# Patient Record
Sex: FEMALE | Race: WHITE | Employment: FULL TIME | URBAN - METROPOLITAN AREA
[De-identification: names, ages, dates, MRNs, and addresses within clinical notes are randomized per-mention and may not be internally consistent; named-entity substitution may affect disease eponyms.]

---

## 2018-06-03 ENCOUNTER — HOSPITAL ENCOUNTER (EMERGENCY)
Age: 23
Discharge: HOME | End: 2018-06-03
Payer: MEDICAID

## 2018-06-03 ENCOUNTER — APPOINTMENT (OUTPATIENT)
Dept: GENERAL RADIOLOGY | Age: 23
End: 2018-06-03
Payer: MEDICAID

## 2018-06-03 VITALS
TEMPERATURE: 98.2 F | HEIGHT: 68 IN | HEART RATE: 87 BPM | DIASTOLIC BLOOD PRESSURE: 87 MMHG | RESPIRATION RATE: 16 BRPM | OXYGEN SATURATION: 99 % | BODY MASS INDEX: 28.79 KG/M2 | WEIGHT: 190 LBS | SYSTOLIC BLOOD PRESSURE: 133 MMHG

## 2018-06-03 DIAGNOSIS — J04.0 LARYNGITIS: Primary | ICD-10-CM

## 2018-06-03 PROCEDURE — 99282 EMERGENCY DEPT VISIT SF MDM: CPT

## 2018-06-03 PROCEDURE — 6370000000 HC RX 637 (ALT 250 FOR IP): Performed by: PHYSICIAN ASSISTANT

## 2018-06-03 PROCEDURE — 71046 X-RAY EXAM CHEST 2 VIEWS: CPT

## 2018-06-03 RX ORDER — PREDNISONE 20 MG/1
40 TABLET ORAL ONCE
Status: COMPLETED | OUTPATIENT
Start: 2018-06-03 | End: 2018-06-03

## 2018-06-03 RX ADMIN — PREDNISONE 40 MG: 20 TABLET ORAL at 22:31

## 2018-06-03 ASSESSMENT — PAIN SCALES - GENERAL: PAINLEVEL_OUTOF10: 3

## 2018-06-03 ASSESSMENT — PAIN DESCRIPTION - LOCATION: LOCATION: THROAT

## 2018-06-03 ASSESSMENT — PAIN DESCRIPTION - PAIN TYPE: TYPE: ACUTE PAIN

## 2018-10-26 ENCOUNTER — HOSPITAL ENCOUNTER (OUTPATIENT)
Age: 23
Setting detail: OBSERVATION
Discharge: HOME | End: 2018-10-28
Attending: EMERGENCY MEDICINE | Admitting: OBSTETRICS & GYNECOLOGY
Payer: MEDICAID

## 2018-10-26 DIAGNOSIS — E87.6 HYPOKALEMIA: ICD-10-CM

## 2018-10-26 DIAGNOSIS — O21.1 HYPEREMESIS GRAVIDARUM WITH ELECTROLYTE IMBALANCE: Primary | ICD-10-CM

## 2018-10-26 PROBLEM — R11.10 HYPEREMESIS: Status: ACTIVE | Noted: 2018-10-26

## 2018-10-26 LAB
ANION GAP SERPL CALCULATED.3IONS-SCNC: 12 MMOL/L (ref 7–16)
BACTERIA: ABNORMAL /HPF
BASOPHILS ABSOLUTE: 0.01 E9/L (ref 0–0.2)
BASOPHILS RELATIVE PERCENT: 0.1 % (ref 0–2)
BILIRUBIN URINE: NEGATIVE
BLOOD, URINE: ABNORMAL
BUN BLDV-MCNC: 5 MG/DL (ref 6–20)
CALCIUM SERPL-MCNC: 9.1 MG/DL (ref 8.6–10.2)
CHLORIDE BLD-SCNC: 98 MMOL/L (ref 98–107)
CLARITY: CLEAR
CO2: 24 MMOL/L (ref 22–29)
COLOR: YELLOW
CREAT SERPL-MCNC: 0.5 MG/DL (ref 0.5–1)
EOSINOPHILS ABSOLUTE: 0.01 E9/L (ref 0.05–0.5)
EOSINOPHILS RELATIVE PERCENT: 0.1 % (ref 0–6)
EPITHELIAL CELLS, UA: ABNORMAL /HPF
GFR AFRICAN AMERICAN: >60
GFR NON-AFRICAN AMERICAN: >60 ML/MIN/1.73
GLUCOSE BLD-MCNC: 77 MG/DL (ref 74–109)
GLUCOSE URINE: NEGATIVE MG/DL
HCT VFR BLD CALC: 35.9 % (ref 34–48)
HEMOGLOBIN: 12.3 G/DL (ref 11.5–15.5)
IMMATURE GRANULOCYTES #: 0.04 E9/L
IMMATURE GRANULOCYTES %: 0.4 % (ref 0–5)
KETONES, URINE: >=80 MG/DL
LEUKOCYTE ESTERASE, URINE: NEGATIVE
LYMPHOCYTES ABSOLUTE: 1.77 E9/L (ref 1.5–4)
LYMPHOCYTES RELATIVE PERCENT: 17.1 % (ref 20–42)
MAGNESIUM: 2 MG/DL (ref 1.6–2.6)
MCH RBC QN AUTO: 29.4 PG (ref 26–35)
MCHC RBC AUTO-ENTMCNC: 34.3 % (ref 32–34.5)
MCV RBC AUTO: 85.9 FL (ref 80–99.9)
MONOCYTES ABSOLUTE: 0.76 E9/L (ref 0.1–0.95)
MONOCYTES RELATIVE PERCENT: 7.4 % (ref 2–12)
MUCUS: PRESENT
NEUTROPHILS ABSOLUTE: 7.74 E9/L (ref 1.8–7.3)
NEUTROPHILS RELATIVE PERCENT: 74.9 % (ref 43–80)
NITRITE, URINE: NEGATIVE
PDW BLD-RTO: 12.9 FL (ref 11.5–15)
PH UA: 6 (ref 5–9)
PLATELET # BLD: 260 E9/L (ref 130–450)
PMV BLD AUTO: 10.1 FL (ref 7–12)
POTASSIUM REFLEX MAGNESIUM: 3.2 MMOL/L (ref 3.5–5)
PROTEIN UA: NEGATIVE MG/DL
RBC # BLD: 4.18 E12/L (ref 3.5–5.5)
RBC UA: ABNORMAL /HPF (ref 0–2)
SODIUM BLD-SCNC: 134 MMOL/L (ref 132–146)
SPECIFIC GRAVITY UA: 1.02 (ref 1–1.03)
UROBILINOGEN, URINE: 1 E.U./DL
WBC # BLD: 10.3 E9/L (ref 4.5–11.5)
WBC UA: ABNORMAL /HPF (ref 0–5)

## 2018-10-26 PROCEDURE — 96374 THER/PROPH/DIAG INJ IV PUSH: CPT

## 2018-10-26 PROCEDURE — 6360000002 HC RX W HCPCS: Performed by: STUDENT IN AN ORGANIZED HEALTH CARE EDUCATION/TRAINING PROGRAM

## 2018-10-26 PROCEDURE — 2580000003 HC RX 258: Performed by: STUDENT IN AN ORGANIZED HEALTH CARE EDUCATION/TRAINING PROGRAM

## 2018-10-26 PROCEDURE — 96375 TX/PRO/DX INJ NEW DRUG ADDON: CPT

## 2018-10-26 PROCEDURE — G0378 HOSPITAL OBSERVATION PER HR: HCPCS

## 2018-10-26 PROCEDURE — 80048 BASIC METABOLIC PNL TOTAL CA: CPT

## 2018-10-26 PROCEDURE — 99284 EMERGENCY DEPT VISIT MOD MDM: CPT

## 2018-10-26 PROCEDURE — 81001 URINALYSIS AUTO W/SCOPE: CPT

## 2018-10-26 PROCEDURE — 6370000000 HC RX 637 (ALT 250 FOR IP): Performed by: STUDENT IN AN ORGANIZED HEALTH CARE EDUCATION/TRAINING PROGRAM

## 2018-10-26 PROCEDURE — 83735 ASSAY OF MAGNESIUM: CPT

## 2018-10-26 PROCEDURE — 85025 COMPLETE CBC W/AUTO DIFF WBC: CPT

## 2018-10-26 RX ORDER — 0.9 % SODIUM CHLORIDE 0.9 %
1000 INTRAVENOUS SOLUTION INTRAVENOUS ONCE
Status: COMPLETED | OUTPATIENT
Start: 2018-10-26 | End: 2018-10-26

## 2018-10-26 RX ORDER — ONDANSETRON 2 MG/ML
4 INJECTION INTRAMUSCULAR; INTRAVENOUS EVERY 6 HOURS PRN
Status: DISCONTINUED | OUTPATIENT
Start: 2018-10-26 | End: 2018-10-26

## 2018-10-26 RX ORDER — LANOLIN ALCOHOL/MO/W.PET/CERES
50 CREAM (GRAM) TOPICAL DAILY
Status: ON HOLD | COMMUNITY
End: 2019-06-15 | Stop reason: HOSPADM

## 2018-10-26 RX ORDER — SODIUM CHLORIDE 0.9 % (FLUSH) 0.9 %
10 SYRINGE (ML) INJECTION EVERY 12 HOURS SCHEDULED
Status: DISCONTINUED | OUTPATIENT
Start: 2018-10-26 | End: 2018-10-28 | Stop reason: HOSPADM

## 2018-10-26 RX ORDER — SODIUM CHLORIDE 0.9 % (FLUSH) 0.9 %
10 SYRINGE (ML) INJECTION PRN
Status: DISCONTINUED | OUTPATIENT
Start: 2018-10-26 | End: 2018-10-28 | Stop reason: HOSPADM

## 2018-10-26 RX ORDER — POTASSIUM CHLORIDE 20 MEQ/1
20 TABLET, EXTENDED RELEASE ORAL ONCE
Status: COMPLETED | OUTPATIENT
Start: 2018-10-26 | End: 2018-10-26

## 2018-10-26 RX ORDER — ONDANSETRON 2 MG/ML
4 INJECTION INTRAMUSCULAR; INTRAVENOUS EVERY 6 HOURS PRN
Status: DISCONTINUED | OUTPATIENT
Start: 2018-10-26 | End: 2018-10-28 | Stop reason: HOSPADM

## 2018-10-26 RX ORDER — ACETAMINOPHEN 325 MG/1
650 TABLET ORAL EVERY 4 HOURS PRN
Status: DISCONTINUED | OUTPATIENT
Start: 2018-10-26 | End: 2018-10-28 | Stop reason: HOSPADM

## 2018-10-26 RX ORDER — POTASSIUM CHLORIDE 7.45 MG/ML
10 INJECTION INTRAVENOUS ONCE
Status: DISCONTINUED | OUTPATIENT
Start: 2018-10-26 | End: 2018-10-26

## 2018-10-26 RX ORDER — SODIUM CHLORIDE, SODIUM LACTATE, POTASSIUM CHLORIDE, CALCIUM CHLORIDE 600; 310; 30; 20 MG/100ML; MG/100ML; MG/100ML; MG/100ML
INJECTION, SOLUTION INTRAVENOUS CONTINUOUS
Status: DISCONTINUED | OUTPATIENT
Start: 2018-10-26 | End: 2018-10-28

## 2018-10-26 RX ORDER — DEXTROSE, SODIUM CHLORIDE, SODIUM LACTATE, POTASSIUM CHLORIDE, AND CALCIUM CHLORIDE 5; .6; .31; .03; .02 G/100ML; G/100ML; G/100ML; G/100ML; G/100ML
1000 INJECTION, SOLUTION INTRAVENOUS ONCE
Status: COMPLETED | OUTPATIENT
Start: 2018-10-26 | End: 2018-10-26

## 2018-10-26 RX ORDER — PROMETHAZINE HYDROCHLORIDE 25 MG/1
25 TABLET ORAL EVERY 6 HOURS PRN
COMMUNITY
End: 2018-11-02 | Stop reason: SDUPTHER

## 2018-10-26 RX ADMIN — SODIUM CHLORIDE, SODIUM LACTATE, POTASSIUM CHLORIDE, CALCIUM CHLORIDE AND DEXTROSE MONOHYDRATE 1000 ML: 5; 600; 310; 30; 20 INJECTION, SOLUTION INTRAVENOUS at 19:57

## 2018-10-26 RX ADMIN — SODIUM CHLORIDE, POTASSIUM CHLORIDE, SODIUM LACTATE AND CALCIUM CHLORIDE: 600; 310; 30; 20 INJECTION, SOLUTION INTRAVENOUS at 20:59

## 2018-10-26 RX ADMIN — SODIUM CHLORIDE 1000 ML: 9 INJECTION, SOLUTION INTRAVENOUS at 18:02

## 2018-10-26 RX ADMIN — POTASSIUM CHLORIDE 20 MEQ: 20 TABLET, EXTENDED RELEASE ORAL at 18:41

## 2018-10-26 RX ADMIN — ONDANSETRON 4 MG: 2 INJECTION INTRAMUSCULAR; INTRAVENOUS at 18:02

## 2018-10-26 RX ADMIN — SODIUM CHLORIDE 1000 ML: 9 INJECTION, SOLUTION INTRAVENOUS at 19:05

## 2018-10-26 ASSESSMENT — ENCOUNTER SYMPTOMS
DIARRHEA: 0
SINUS PRESSURE: 0
ABDOMINAL DISTENTION: 0
WHEEZING: 0
ABDOMINAL PAIN: 0
COUGH: 0
BACK PAIN: 0
SHORTNESS OF BREATH: 0
EYE PAIN: 0
VOMITING: 1
COLOR CHANGE: 0
SORE THROAT: 0
EYE REDNESS: 0
EYE DISCHARGE: 0
BLOOD IN STOOL: 0
NAUSEA: 1

## 2018-10-26 ASSESSMENT — PAIN SCALES - GENERAL
PAINLEVEL_OUTOF10: 0
PAINLEVEL_OUTOF10: 2

## 2018-10-26 NOTE — ED PROVIDER NOTES
Perla Haro is a 26year old  female who presents with nausea and vomiting that has been present since . She was unable to eat since . She was seen at [facility] 3 times in the last week. She is followed by [facility]. She was seen there today. She was advised to come to the ED if symptoms returned today. She has tried phenergan, phenergan suppositories, Reglan, and B6 without improvement. Patient reports having these symptoms with one of her previous pregnancies. She reports that she was diagnosed with UTI at [facility] and given Keflex however, she has been unable to take it due to vomiting. Patient reports that she has had an ultrasound this pregnancy. She denies any hematemesis. She also denies vaginal bleeding, discharge, contractions, loss of fluid or tissue. The history is provided by the patient. Nausea & Vomiting   Severity:  Severe  Duration:  5 days  Timing:  Constant  Number of daily episodes:  10  Quality:  Stomach contents and undigested food  Feeding tolerance: nothing. How soon after eating does vomiting occur:  5 minutes  Progression:  Worsening  Chronicity:  New  Recent urination:  Decreased  Relieved by:  Nothing  Worsened by:  Nothing  Ineffective treatments:  Antiemetics  Associated symptoms: no abdominal pain, no arthralgias, no chills, no cough, no diarrhea, no fever, no headaches, no sore throat and no URI    Risk factors: pregnant    Risk factors: no sick contacts        Review of Systems   Constitutional: Negative for chills and fever. HENT: Negative for congestion, ear pain, sinus pressure and sore throat. Eyes: Negative for pain, discharge and redness. Respiratory: Negative for cough, shortness of breath and wheezing. Cardiovascular: Negative for chest pain, palpitations and leg swelling. Gastrointestinal: Positive for nausea and vomiting. Negative for abdominal distention, abdominal pain, blood in stool and diarrhea. of Zofran were discussed with patient she was informed of the new information re increased risk of miscarriage and malformations with taking Zofran in the first trimester. Patient would like to try Zofran as she is unable to tolerate diet PO. She was found to have ketones on UA and mild hypokalemia. Patient was able to tolerate oral potassium following Zofran. She does not currently have OB. Dr. Michelle Reeves will admit patient for observation, fluids, and antiemetics. Patient comfortable with plan. She has been seen in ED 3 times prior to today.              --------------------------------------------- PAST HISTORY ---------------------------------------------  Past Medical History:  has no past medical history on file. Past Surgical History:  has no past surgical history on file. Social History:  reports that she has never smoked. She has never used smokeless tobacco. She reports that she does not drink alcohol or use drugs. Family History: family history is not on file. The patients home medications have been reviewed. Allergies: Patient has no known allergies.     -------------------------------------------------- RESULTS -------------------------------------------------    LABS:  Results for orders placed or performed during the hospital encounter of 10/26/18   Urinalysis with Microscopic   Result Value Ref Range    Color, UA Yellow Straw/Yellow    Clarity, UA Clear Clear    Glucose, Ur Negative Negative mg/dL    Bilirubin Urine Negative Negative    Ketones, Urine >=80 (A) Negative mg/dL    Specific Gravity, UA 1.020 1.005 - 1.030    Blood, Urine TRACE-INTACT Negative    pH, UA 6.0 5.0 - 9.0    Protein, UA Negative Negative mg/dL    Urobilinogen, Urine 1.0 <2.0 E.U./dL    Nitrite, Urine Negative Negative    Leukocyte Esterase, Urine Negative Negative    Mucus, UA Present     WBC, UA 1-3 0 - 5 /HPF    RBC, UA 2-5 0 - 2 /HPF    Epi Cells FEW /HPF    Bacteria, UA RARE (A) /HPF   CBC auto differential

## 2018-10-27 ENCOUNTER — APPOINTMENT (OUTPATIENT)
Dept: ULTRASOUND IMAGING | Age: 23
End: 2018-10-27
Payer: MEDICAID

## 2018-10-27 LAB
ALBUMIN SERPL-MCNC: 4.1 G/DL (ref 3.5–5.2)
ALP BLD-CCNC: 74 U/L (ref 35–104)
ALT SERPL-CCNC: 21 U/L (ref 0–32)
ANION GAP SERPL CALCULATED.3IONS-SCNC: 11 MMOL/L (ref 7–16)
AST SERPL-CCNC: 17 U/L (ref 0–31)
BACTERIA: ABNORMAL /HPF
BILIRUB SERPL-MCNC: 0.5 MG/DL (ref 0–1.2)
BILIRUBIN URINE: NEGATIVE
BLOOD, URINE: NEGATIVE
BUN BLDV-MCNC: 4 MG/DL (ref 6–20)
CALCIUM SERPL-MCNC: 8.8 MG/DL (ref 8.6–10.2)
CHLORIDE BLD-SCNC: 102 MMOL/L (ref 98–107)
CLARITY: CLEAR
CO2: 24 MMOL/L (ref 22–29)
COLOR: YELLOW
CREAT SERPL-MCNC: 0.5 MG/DL (ref 0.5–1)
EPITHELIAL CELLS, UA: ABNORMAL /HPF
GFR AFRICAN AMERICAN: >60
GFR NON-AFRICAN AMERICAN: >60 ML/MIN/1.73
GLUCOSE BLD-MCNC: 72 MG/DL (ref 74–109)
GLUCOSE URINE: NEGATIVE MG/DL
KETONES, URINE: >=80 MG/DL
LEUKOCYTE ESTERASE, URINE: NEGATIVE
NITRITE, URINE: NEGATIVE
PH UA: 7 (ref 5–9)
POTASSIUM SERPL-SCNC: 3.2 MMOL/L (ref 3.5–5)
PROTEIN UA: NEGATIVE MG/DL
RBC UA: ABNORMAL /HPF (ref 0–2)
SODIUM BLD-SCNC: 137 MMOL/L (ref 132–146)
SPECIFIC GRAVITY UA: 1.01 (ref 1–1.03)
TOTAL PROTEIN: 6.8 G/DL (ref 6.4–8.3)
UROBILINOGEN, URINE: 1 E.U./DL
WBC UA: ABNORMAL /HPF (ref 0–5)

## 2018-10-27 PROCEDURE — 2580000003 HC RX 258: Performed by: STUDENT IN AN ORGANIZED HEALTH CARE EDUCATION/TRAINING PROGRAM

## 2018-10-27 PROCEDURE — 81001 URINALYSIS AUTO W/SCOPE: CPT

## 2018-10-27 PROCEDURE — 2580000003 HC RX 258: Performed by: FAMILY MEDICINE

## 2018-10-27 PROCEDURE — 6370000000 HC RX 637 (ALT 250 FOR IP): Performed by: FAMILY MEDICINE

## 2018-10-27 PROCEDURE — 96372 THER/PROPH/DIAG INJ SC/IM: CPT

## 2018-10-27 PROCEDURE — 80053 COMPREHEN METABOLIC PANEL: CPT

## 2018-10-27 PROCEDURE — 36415 COLL VENOUS BLD VENIPUNCTURE: CPT

## 2018-10-27 PROCEDURE — 76817 TRANSVAGINAL US OBSTETRIC: CPT

## 2018-10-27 PROCEDURE — 6370000000 HC RX 637 (ALT 250 FOR IP): Performed by: INTERNAL MEDICINE

## 2018-10-27 PROCEDURE — G0378 HOSPITAL OBSERVATION PER HR: HCPCS

## 2018-10-27 PROCEDURE — 2580000003 HC RX 258: Performed by: OBSTETRICS & GYNECOLOGY

## 2018-10-27 PROCEDURE — 6360000002 HC RX W HCPCS: Performed by: OBSTETRICS & GYNECOLOGY

## 2018-10-27 RX ORDER — PROMETHAZINE HYDROCHLORIDE 25 MG/ML
6.25 INJECTION, SOLUTION INTRAMUSCULAR; INTRAVENOUS EVERY 6 HOURS PRN
Status: DISCONTINUED | OUTPATIENT
Start: 2018-10-27 | End: 2018-10-28 | Stop reason: HOSPADM

## 2018-10-27 RX ORDER — POTASSIUM CHLORIDE 20 MEQ/1
40 TABLET, EXTENDED RELEASE ORAL ONCE
Status: COMPLETED | OUTPATIENT
Start: 2018-10-27 | End: 2018-10-27

## 2018-10-27 RX ORDER — DEXTROSE, SODIUM CHLORIDE, SODIUM LACTATE, POTASSIUM CHLORIDE, AND CALCIUM CHLORIDE 5; .6; .31; .03; .02 G/100ML; G/100ML; G/100ML; G/100ML; G/100ML
INJECTION, SOLUTION INTRAVENOUS CONTINUOUS
Status: DISCONTINUED | OUTPATIENT
Start: 2018-10-27 | End: 2018-10-28 | Stop reason: HOSPADM

## 2018-10-27 RX ORDER — POTASSIUM BICARBONATE 25 MEQ/1
25 TABLET, EFFERVESCENT ORAL ONCE
Status: DISCONTINUED | OUTPATIENT
Start: 2018-10-27 | End: 2018-10-28

## 2018-10-27 RX ADMIN — SODIUM CHLORIDE, SODIUM LACTATE, POTASSIUM CHLORIDE, CALCIUM CHLORIDE AND DEXTROSE MONOHYDRATE: 5; 600; 310; 30; 20 INJECTION, SOLUTION INTRAVENOUS at 18:57

## 2018-10-27 RX ADMIN — SODIUM CHLORIDE, SODIUM LACTATE, POTASSIUM CHLORIDE, CALCIUM CHLORIDE AND DEXTROSE MONOHYDRATE: 5; 600; 310; 30; 20 INJECTION, SOLUTION INTRAVENOUS at 12:44

## 2018-10-27 RX ADMIN — ACETAMINOPHEN 650 MG: 325 TABLET ORAL at 02:11

## 2018-10-27 RX ADMIN — Medication 10 ML: at 09:41

## 2018-10-27 RX ADMIN — ACETAMINOPHEN 650 MG: 325 TABLET ORAL at 13:31

## 2018-10-27 RX ADMIN — ACETAMINOPHEN 650 MG: 325 TABLET ORAL at 18:55

## 2018-10-27 RX ADMIN — SODIUM CHLORIDE, POTASSIUM CHLORIDE, SODIUM LACTATE AND CALCIUM CHLORIDE: 600; 310; 30; 20 INJECTION, SOLUTION INTRAVENOUS at 02:12

## 2018-10-27 RX ADMIN — POTASSIUM CHLORIDE 40 MEQ: 20 TABLET, EXTENDED RELEASE ORAL at 14:20

## 2018-10-27 RX ADMIN — PROMETHAZINE HYDROCHLORIDE 6.25 MG: 25 INJECTION INTRAMUSCULAR; INTRAVENOUS at 22:34

## 2018-10-27 ASSESSMENT — PAIN SCALES - GENERAL
PAINLEVEL_OUTOF10: 0
PAINLEVEL_OUTOF10: 4
PAINLEVEL_OUTOF10: 0
PAINLEVEL_OUTOF10: 4
PAINLEVEL_OUTOF10: 4
PAINLEVEL_OUTOF10: 0
PAINLEVEL_OUTOF10: 0

## 2018-10-27 ASSESSMENT — PAIN DESCRIPTION - DESCRIPTORS
DESCRIPTORS: ACHING
DESCRIPTORS: ACHING;DISCOMFORT
DESCRIPTORS: ACHING

## 2018-10-27 ASSESSMENT — PAIN DESCRIPTION - PAIN TYPE
TYPE: ACUTE PAIN

## 2018-10-27 ASSESSMENT — PAIN DESCRIPTION - LOCATION
LOCATION: HEAD
LOCATION: HEAD
LOCATION: BACK

## 2018-10-27 ASSESSMENT — PAIN DESCRIPTION - FREQUENCY
FREQUENCY: CONTINUOUS

## 2018-10-27 ASSESSMENT — PAIN DESCRIPTION - PROGRESSION: CLINICAL_PROGRESSION: GRADUALLY WORSENING

## 2018-10-27 NOTE — CONSULTS
[address] [address]                                 CONSULTATION    PATIENT NAME: Kiley Bright              :         1995  MED REC NO:   [id number]                            ROOM:  ACCOUNT NO:   [de-identified]                           ADMIT DATE:  10/26/2018  PROVIDER:     Dejah Tinoco DO    CONSULT DATE:  10/27/2018    ADMITTING PHYSICIAN:  Breonna Robles M.D. REASON FOR CONSULTATION:  Hypokalemia. CHIEF COMPLAINT:  Nausea and vomiting. HISTORY OF PRESENT ILLNESS:  The patient is a 73-year-old   female, who is pregnant, due date 2019, who presented to the  hospital with complaints of persistent nausea and vomiting for the  past six days. She was seen at [facility], given IV fluids  and medications, and discharged home. She had persistent symptoms and  was admitted at [address] [place] [address] by Dr. Shavonne Wilkinson. Laboratory review remarkable for potassium of 3.2. PAST MEDICAL HISTORY:  None. PAST SURGICAL HISTORY:  [place] teeth extraction. REVIEW OF SYSTEMS:  Remarkable for above-stated chief complaint. ALLERGIES:  None known. PRIMARY CARE PHYSICIAN:  None. MEDICATIONS PRIOR TO ADMISSION:  Vitamin B6, Phenergan, and Unasyn. PHYSICAL EXAMINATION:  GENERAL APPEARANCE:  Exam reveals a 73-year-old  female, who  is alert and oriented x3, cooperative, and a good historian. VITAL SIGNS:  Temperature 98.5, pulse 78, respiration 16, blood  pressure 92/51. HEENT:  Head:  Normocephalic and atraumatic. Eyes:  Pupils equal and  reactive to light. Extraocular muscles intact. Fundi not well  visualized. Nose:  No obstruction, polyp, or discharge noted. Mouth:  Mucosa without lesion. Teeth:  Good repair. Pharynx:  Non-injectable  without exudate. NECK:  Supple. No JVD. No thyromegaly. No carotid bruits.   HEART:  Regular rate and rhythm

## 2018-10-28 VITALS
HEART RATE: 74 BPM | TEMPERATURE: 97.9 F | BODY MASS INDEX: 32.02 KG/M2 | OXYGEN SATURATION: 99 % | HEIGHT: 67 IN | DIASTOLIC BLOOD PRESSURE: 86 MMHG | WEIGHT: 204 LBS | SYSTOLIC BLOOD PRESSURE: 132 MMHG | RESPIRATION RATE: 18 BRPM

## 2018-10-28 LAB
ANION GAP SERPL CALCULATED.3IONS-SCNC: 12 MMOL/L (ref 7–16)
BACTERIA: ABNORMAL /HPF
BILIRUBIN URINE: NEGATIVE
BLOOD, URINE: NEGATIVE
BUN BLDV-MCNC: 3 MG/DL (ref 6–20)
CALCIUM SERPL-MCNC: 9.5 MG/DL (ref 8.6–10.2)
CHLORIDE BLD-SCNC: 102 MMOL/L (ref 98–107)
CLARITY: CLEAR
CO2: 27 MMOL/L (ref 22–29)
COLOR: YELLOW
CREAT SERPL-MCNC: 0.5 MG/DL (ref 0.5–1)
EPITHELIAL CELLS, UA: ABNORMAL /HPF
GFR AFRICAN AMERICAN: >60
GFR NON-AFRICAN AMERICAN: >60 ML/MIN/1.73
GLUCOSE BLD-MCNC: 88 MG/DL (ref 74–109)
GLUCOSE URINE: NEGATIVE MG/DL
KETONES, URINE: NEGATIVE MG/DL
LEUKOCYTE ESTERASE, URINE: NEGATIVE
NITRITE, URINE: NEGATIVE
PH UA: 7.5 (ref 5–9)
POTASSIUM SERPL-SCNC: 3.7 MMOL/L (ref 3.5–5)
PROTEIN UA: NEGATIVE MG/DL
RBC UA: ABNORMAL /HPF (ref 0–2)
SODIUM BLD-SCNC: 141 MMOL/L (ref 132–146)
SPECIFIC GRAVITY UA: 1.01 (ref 1–1.03)
UROBILINOGEN, URINE: 0.2 E.U./DL
WBC UA: ABNORMAL /HPF (ref 0–5)

## 2018-10-28 PROCEDURE — 36415 COLL VENOUS BLD VENIPUNCTURE: CPT

## 2018-10-28 PROCEDURE — 2580000003 HC RX 258: Performed by: OBSTETRICS & GYNECOLOGY

## 2018-10-28 PROCEDURE — 80048 BASIC METABOLIC PNL TOTAL CA: CPT

## 2018-10-28 PROCEDURE — G0378 HOSPITAL OBSERVATION PER HR: HCPCS

## 2018-10-28 PROCEDURE — 81001 URINALYSIS AUTO W/SCOPE: CPT

## 2018-10-28 RX ADMIN — SODIUM CHLORIDE, SODIUM LACTATE, POTASSIUM CHLORIDE, CALCIUM CHLORIDE AND DEXTROSE MONOHYDRATE: 5; 600; 310; 30; 20 INJECTION, SOLUTION INTRAVENOUS at 01:20

## 2018-10-28 ASSESSMENT — PAIN SCALES - GENERAL: PAINLEVEL_OUTOF10: 0

## 2018-10-28 NOTE — PROGRESS NOTES
Spoke to Dr. Rosie Merida re: new consult and potassium level. Will place orders.
PROT 6.8 10/27/2018    LABALBU 4.1 10/27/2018    CALCIUM 8.8 10/27/2018    BILITOT 0.5 10/27/2018    ALKPHOS 74 10/27/2018    AST 17 10/27/2018    ALT 21 10/27/2018     Warfarin PT/INR:  No results found for: INR, PROTIME    Assessment:    Active Problems:    Hyperemesis  Resolved Problems:    * No resolved hospital problems.  *  hypokalemia    Plan:  Await am lab dc planning cont as per ob        Farhan Germain  10:40 AM  10/28/2018

## 2018-11-02 ENCOUNTER — HOSPITAL ENCOUNTER (EMERGENCY)
Age: 23
Discharge: HOME | End: 2018-11-02
Attending: EMERGENCY MEDICINE
Payer: MEDICAID

## 2018-11-02 VITALS
TEMPERATURE: 98.4 F | HEIGHT: 67 IN | DIASTOLIC BLOOD PRESSURE: 63 MMHG | HEART RATE: 78 BPM | BODY MASS INDEX: 29.82 KG/M2 | RESPIRATION RATE: 16 BRPM | SYSTOLIC BLOOD PRESSURE: 130 MMHG | WEIGHT: 190 LBS | OXYGEN SATURATION: 99 %

## 2018-11-02 DIAGNOSIS — O21.0 MILD HYPEREMESIS GRAVIDARUM, ANTEPARTUM: Primary | ICD-10-CM

## 2018-11-02 LAB
BASOPHILS ABSOLUTE: 0.02 E9/L (ref 0–0.2)
BASOPHILS RELATIVE PERCENT: 0.2 % (ref 0–2)
BILIRUBIN URINE: NEGATIVE
BLOOD, URINE: NEGATIVE
CLARITY: CLEAR
COLOR: YELLOW
EOSINOPHILS ABSOLUTE: 0.03 E9/L (ref 0.05–0.5)
EOSINOPHILS RELATIVE PERCENT: 0.3 % (ref 0–6)
GLUCOSE URINE: NEGATIVE MG/DL
HCT VFR BLD CALC: 41.9 % (ref 34–48)
HEMOGLOBIN: 14 G/DL (ref 11.5–15.5)
IMMATURE GRANULOCYTES #: 0.04 E9/L
IMMATURE GRANULOCYTES %: 0.4 % (ref 0–5)
KETONES, URINE: NEGATIVE MG/DL
LEUKOCYTE ESTERASE, URINE: NEGATIVE
LYMPHOCYTES ABSOLUTE: 1.44 E9/L (ref 1.5–4)
LYMPHOCYTES RELATIVE PERCENT: 15.5 % (ref 20–42)
MCH RBC QN AUTO: 29.2 PG (ref 26–35)
MCHC RBC AUTO-ENTMCNC: 33.4 % (ref 32–34.5)
MCV RBC AUTO: 87.5 FL (ref 80–99.9)
MONOCYTES ABSOLUTE: 0.62 E9/L (ref 0.1–0.95)
MONOCYTES RELATIVE PERCENT: 6.7 % (ref 2–12)
NEUTROPHILS ABSOLUTE: 7.14 E9/L (ref 1.8–7.3)
NEUTROPHILS RELATIVE PERCENT: 76.9 % (ref 43–80)
NITRITE, URINE: NEGATIVE
PDW BLD-RTO: 13.5 FL (ref 11.5–15)
PH UA: 7 (ref 5–9)
PLATELET # BLD: 315 E9/L (ref 130–450)
PMV BLD AUTO: 10.6 FL (ref 7–12)
PROTEIN UA: NEGATIVE MG/DL
RBC # BLD: 4.79 E12/L (ref 3.5–5.5)
SPECIFIC GRAVITY UA: 1.01 (ref 1–1.03)
UROBILINOGEN, URINE: 0.2 E.U./DL
WBC # BLD: 9.3 E9/L (ref 4.5–11.5)

## 2018-11-02 PROCEDURE — 81003 URINALYSIS AUTO W/O SCOPE: CPT

## 2018-11-02 PROCEDURE — 85025 COMPLETE CBC W/AUTO DIFF WBC: CPT

## 2018-11-02 PROCEDURE — 6360000002 HC RX W HCPCS: Performed by: EMERGENCY MEDICINE

## 2018-11-02 PROCEDURE — 99283 EMERGENCY DEPT VISIT LOW MDM: CPT

## 2018-11-02 PROCEDURE — 96372 THER/PROPH/DIAG INJ SC/IM: CPT

## 2018-11-02 PROCEDURE — 2580000003 HC RX 258: Performed by: EMERGENCY MEDICINE

## 2018-11-02 RX ORDER — PROMETHAZINE HYDROCHLORIDE 25 MG/ML
6.25 INJECTION, SOLUTION INTRAMUSCULAR; INTRAVENOUS ONCE
Status: COMPLETED | OUTPATIENT
Start: 2018-11-02 | End: 2018-11-02

## 2018-11-02 RX ORDER — PROMETHAZINE HYDROCHLORIDE 25 MG/1
25 SUPPOSITORY RECTAL EVERY 4 HOURS PRN
Qty: 10 SUPPOSITORY | Refills: 0 | Status: SHIPPED | OUTPATIENT
Start: 2018-11-02 | End: 2018-11-09

## 2018-11-02 RX ORDER — PROMETHAZINE HYDROCHLORIDE 25 MG/1
25 TABLET ORAL EVERY 4 HOURS
Qty: 20 TABLET | Refills: 0 | Status: SHIPPED | OUTPATIENT
Start: 2018-11-02 | End: 2018-11-07

## 2018-11-02 RX ORDER — ONDANSETRON 4 MG/1
4 TABLET, ORALLY DISINTEGRATING ORAL ONCE
Status: COMPLETED | OUTPATIENT
Start: 2018-11-02 | End: 2018-11-02

## 2018-11-02 RX ORDER — 0.9 % SODIUM CHLORIDE 0.9 %
1000 INTRAVENOUS SOLUTION INTRAVENOUS ONCE
Status: COMPLETED | OUTPATIENT
Start: 2018-11-02 | End: 2018-11-02

## 2018-11-02 RX ADMIN — PROMETHAZINE HYDROCHLORIDE 6.25 MG: 25 INJECTION INTRAMUSCULAR; INTRAVENOUS at 14:52

## 2018-11-02 RX ADMIN — SODIUM CHLORIDE 1000 ML: 9 INJECTION, SOLUTION INTRAVENOUS at 15:21

## 2018-11-02 RX ADMIN — ONDANSETRON 4 MG: 4 TABLET, ORALLY DISINTEGRATING ORAL at 17:24

## 2018-11-02 RX ADMIN — SODIUM CHLORIDE 1000 ML: 9 INJECTION, SOLUTION INTRAVENOUS at 16:20

## 2018-11-06 ENCOUNTER — HOSPITAL ENCOUNTER (EMERGENCY)
Age: 23
Discharge: HOME | End: 2018-11-06
Attending: EMERGENCY MEDICINE
Payer: MEDICAID

## 2018-11-06 VITALS
OXYGEN SATURATION: 98 % | WEIGHT: 192 LBS | TEMPERATURE: 98.1 F | HEART RATE: 88 BPM | SYSTOLIC BLOOD PRESSURE: 118 MMHG | HEIGHT: 67 IN | BODY MASS INDEX: 30.13 KG/M2 | DIASTOLIC BLOOD PRESSURE: 83 MMHG | RESPIRATION RATE: 16 BRPM

## 2018-11-06 DIAGNOSIS — R11.2 NON-INTRACTABLE VOMITING WITH NAUSEA, UNSPECIFIED VOMITING TYPE: Primary | ICD-10-CM

## 2018-11-06 LAB
ALBUMIN SERPL-MCNC: 4.6 G/DL (ref 3.5–5.2)
ALP BLD-CCNC: 79 U/L (ref 35–104)
ALT SERPL-CCNC: 16 U/L (ref 0–32)
ANION GAP SERPL CALCULATED.3IONS-SCNC: 13 MMOL/L (ref 7–16)
AST SERPL-CCNC: 44 U/L (ref 0–31)
BACTERIA: NORMAL /HPF
BASOPHILS ABSOLUTE: 0.02 E9/L (ref 0–0.2)
BASOPHILS RELATIVE PERCENT: 0.2 % (ref 0–2)
BILIRUB SERPL-MCNC: 0.3 MG/DL (ref 0–1.2)
BILIRUBIN URINE: ABNORMAL
BLOOD, URINE: ABNORMAL
BUN BLDV-MCNC: 5 MG/DL (ref 6–20)
CALCIUM SERPL-MCNC: 9.8 MG/DL (ref 8.6–10.2)
CHLORIDE BLD-SCNC: 98 MMOL/L (ref 98–107)
CHP ED QC CHECK: NORMAL
CLARITY: CLEAR
CO2: 23 MMOL/L (ref 22–29)
COLOR: YELLOW
CREAT SERPL-MCNC: 0.5 MG/DL (ref 0.5–1)
EOSINOPHILS ABSOLUTE: 0.01 E9/L (ref 0.05–0.5)
EOSINOPHILS RELATIVE PERCENT: 0.1 % (ref 0–6)
EPITHELIAL CELLS, UA: NORMAL /HPF
GFR AFRICAN AMERICAN: >60
GFR NON-AFRICAN AMERICAN: >60 ML/MIN/1.73
GLUCOSE BLD-MCNC: 84 MG/DL
GLUCOSE BLD-MCNC: 84 MG/DL (ref 74–99)
GLUCOSE URINE: NEGATIVE MG/DL
HCT VFR BLD CALC: 40.9 % (ref 34–48)
HEMOGLOBIN: 13.8 G/DL (ref 11.5–15.5)
IMMATURE GRANULOCYTES #: 0.04 E9/L
IMMATURE GRANULOCYTES %: 0.4 % (ref 0–5)
KETONES, URINE: NEGATIVE MG/DL
LACTIC ACID: 1.1 MMOL/L (ref 0.5–2.2)
LEUKOCYTE ESTERASE, URINE: NEGATIVE
LIPASE: 31 U/L (ref 13–60)
LYMPHOCYTES ABSOLUTE: 1.42 E9/L (ref 1.5–4)
LYMPHOCYTES RELATIVE PERCENT: 12.8 % (ref 20–42)
MCH RBC QN AUTO: 29.3 PG (ref 26–35)
MCHC RBC AUTO-ENTMCNC: 33.7 % (ref 32–34.5)
MCV RBC AUTO: 86.8 FL (ref 80–99.9)
METER GLUCOSE: 84 MG/DL (ref 74–99)
MONOCYTES ABSOLUTE: 0.76 E9/L (ref 0.1–0.95)
MONOCYTES RELATIVE PERCENT: 6.8 % (ref 2–12)
NEUTROPHILS ABSOLUTE: 8.88 E9/L (ref 1.8–7.3)
NEUTROPHILS RELATIVE PERCENT: 79.7 % (ref 43–80)
NITRITE, URINE: NEGATIVE
PDW BLD-RTO: 13 FL (ref 11.5–15)
PH UA: 6 (ref 5–9)
PLATELET # BLD: 310 E9/L (ref 130–450)
PMV BLD AUTO: 10.5 FL (ref 7–12)
POTASSIUM SERPL-SCNC: 5.3 MMOL/L (ref 3.5–5)
PROTEIN UA: ABNORMAL MG/DL
RBC # BLD: 4.71 E12/L (ref 3.5–5.5)
RBC UA: NORMAL /HPF (ref 0–2)
SODIUM BLD-SCNC: 134 MMOL/L (ref 132–146)
SPECIFIC GRAVITY UA: >=1.03 (ref 1–1.03)
TOTAL PROTEIN: 8.4 G/DL (ref 6.4–8.3)
UROBILINOGEN, URINE: 0.2 E.U./DL
WBC # BLD: 11.1 E9/L (ref 4.5–11.5)
WBC UA: NORMAL /HPF (ref 0–5)

## 2018-11-06 PROCEDURE — 80053 COMPREHEN METABOLIC PANEL: CPT

## 2018-11-06 PROCEDURE — 6360000002 HC RX W HCPCS: Performed by: EMERGENCY MEDICINE

## 2018-11-06 PROCEDURE — 81001 URINALYSIS AUTO W/SCOPE: CPT

## 2018-11-06 PROCEDURE — 99284 EMERGENCY DEPT VISIT MOD MDM: CPT

## 2018-11-06 PROCEDURE — 82962 GLUCOSE BLOOD TEST: CPT

## 2018-11-06 PROCEDURE — 83690 ASSAY OF LIPASE: CPT

## 2018-11-06 PROCEDURE — 2580000003 HC RX 258: Performed by: EMERGENCY MEDICINE

## 2018-11-06 PROCEDURE — 85025 COMPLETE CBC W/AUTO DIFF WBC: CPT

## 2018-11-06 PROCEDURE — 83605 ASSAY OF LACTIC ACID: CPT

## 2018-11-06 PROCEDURE — 36415 COLL VENOUS BLD VENIPUNCTURE: CPT

## 2018-11-06 PROCEDURE — 96374 THER/PROPH/DIAG INJ IV PUSH: CPT

## 2018-11-06 RX ORDER — 0.9 % SODIUM CHLORIDE 0.9 %
1000 INTRAVENOUS SOLUTION INTRAVENOUS ONCE
Status: COMPLETED | OUTPATIENT
Start: 2018-11-06 | End: 2018-11-06

## 2018-11-06 RX ORDER — SODIUM CHLORIDE 0.9 % (FLUSH) 0.9 %
10 SYRINGE (ML) INJECTION PRN
Status: DISCONTINUED | OUTPATIENT
Start: 2018-11-06 | End: 2018-11-06 | Stop reason: HOSPADM

## 2018-11-06 RX ORDER — METOCLOPRAMIDE HYDROCHLORIDE 5 MG/ML
10 INJECTION INTRAMUSCULAR; INTRAVENOUS ONCE
Status: COMPLETED | OUTPATIENT
Start: 2018-11-06 | End: 2018-11-06

## 2018-11-06 RX ADMIN — METOCLOPRAMIDE 10 MG: 5 INJECTION, SOLUTION INTRAMUSCULAR; INTRAVENOUS at 19:12

## 2018-11-06 RX ADMIN — SODIUM CHLORIDE 1000 ML: 9 INJECTION, SOLUTION INTRAVENOUS at 19:00

## 2018-11-06 ASSESSMENT — ENCOUNTER SYMPTOMS
NAUSEA: 1
BACK PAIN: 0
ABDOMINAL PAIN: 0
BLOOD IN STOOL: 0
SHORTNESS OF BREATH: 0
COUGH: 0
VOMITING: 1

## 2018-11-07 NOTE — ED PROVIDER NOTES
Neutrophils % 79.7 43.0 - 80.0 %    Immature Granulocytes % 0.4 0.0 - 5.0 %    Lymphocytes % 12.8 (L) 20.0 - 42.0 %    Monocytes % 6.8 2.0 - 12.0 %    Eosinophils % 0.1 0.0 - 6.0 %    Basophils % 0.2 0.0 - 2.0 %    Neutrophils # 8.88 (H) 1.80 - 7.30 E9/L    Immature Granulocytes # 0.04 E9/L    Lymphocytes # 1.42 (L) 1.50 - 4.00 E9/L    Monocytes # 0.76 0.10 - 0.95 E9/L    Eosinophils # 0.01 (L) 0.05 - 0.50 E9/L    Basophils # 0.02 0.00 - 0.20 E9/L   Comprehensive Metabolic Panel   Result Value Ref Range    Sodium 134 132 - 146 mmol/L    Potassium 5.3 (H) 3.5 - 5.0 mmol/L    Chloride 98 98 - 107 mmol/L    CO2 23 22 - 29 mmol/L    Anion Gap 13 7 - 16 mmol/L    Glucose 84 74 - 99 mg/dL    BUN 5 (L) 6 - 20 mg/dL    CREATININE 0.5 0.5 - 1.0 mg/dL    GFR Non-African American >60 >=60 mL/min/1.73    GFR African American >60     Calcium 9.8 8.6 - 10.2 mg/dL    Total Protein 8.4 (H) 6.4 - 8.3 g/dL    Alb 4.6 3.5 - 5.2 g/dL    Total Bilirubin 0.3 0.0 - 1.2 mg/dL    Alkaline Phosphatase 79 35 - 104 U/L    ALT 16 0 - 32 U/L    AST 44 (H) 0 - 31 U/L   Lipase   Result Value Ref Range    Lipase 31 13 - 60 U/L   Urinalysis   Result Value Ref Range    Color, UA Yellow Straw/Yellow    Clarity, UA Clear Clear    Glucose, Ur Negative Negative mg/dL    Bilirubin Urine SMALL (A) Negative    Ketones, Urine Negative Negative mg/dL    Specific Gravity, UA >=1.030 1.005 - 1.030    Blood, Urine TRACE-INTACT Negative    pH, UA 6.0 5.0 - 9.0    Protein, UA TRACE Negative mg/dL    Urobilinogen, Urine 0.2 <2.0 E.U./dL    Nitrite, Urine Negative Negative    Leukocyte Esterase, Urine Negative Negative   Microscopic Urinalysis   Result Value Ref Range    WBC, UA 1-3 0 - 5 /HPF    RBC, UA NONE 0 - 2 /HPF    Epi Cells FEW /HPF    Bacteria, UA NONE /HPF   POCT Glucose   Result Value Ref Range    Glucose 84 mg/dL    QC OK? ok    POCT Glucose   Result Value Ref Range    Meter Glucose 84 74 - 99 mg/dL       Radiology:  No orders to display

## 2019-01-24 NOTE — DISCHARGE INSTR - COC
Continuity of Care Form    Patient Name: Soila Nettles   :  1995  MRN:  [id number]    Admit date:  2018  Discharge date:  ***    Code Status Order: Prior   Advance Directives:     Admitting Physician:  No admitting provider for patient encounter. PCP: No primary care provider on file. Discharging Nurse: [facility] Unit/Room#:   Discharging Unit Phone Number: ***    Emergency Contact:   Extended Emergency Contact Information  Primary Emergency Contact: [address] Phone: [phone]  Relation: Grandparent    Past Surgical History:  History reviewed. No pertinent surgical history. Immunization History: There is no immunization history on file for this patient.     Active Problems:  Patient Active Problem List   Diagnosis Code    Hyperemesis R11.10       Isolation/Infection:   Isolation          No Isolation            Nurse Assessment:  Last Vital Signs: /83   Pulse 88   Temp 98.1 °F (36.7 °C) (Oral)   Resp 16   Ht 5' 7\" (1.702 m)   Wt 192 lb (87.1 kg)   LMP 2018   SpO2 98%   BMI 30.07 kg/m²     Last documented pain score (0-10 scale):    Last Weight:   Wt Readings from Last 1 Encounters:   18 192 lb (87.1 kg)     Mental Status:  {IP PT MENTAL STATUS:38332}    IV Access:  { BRENDA IV ACCESS:028923602}    Nursing Mobility/ADLs:  Walking   {CHP DME VDNC:934877445}  Transfer  {CHP DME FQGR:240551161}  Bathing  {CHP DME KCDO:061204000}  Dressing  {CHP DME SAYI:692617649}  Toileting  {CHP DME RII}  Feeding  {CHP DME SSML:299510752}  Med Admin  {CHP DME KUFF:129395352}  Med Delivery   [facility] MED Delivery:175874159}    Wound Care Documentation and Therapy:        Elimination:  Continence:   · Bowel: {YES / GL:51790}  · Bladder: {YES / LITZY:55204}  Urinary Catheter: {Urinary Catheter:618251967}   Colostomy/Ileostomy/Ileal Conduit: {YES / RH:95895}       Date of Last BM: ***  No intake or output data in

## 2019-04-09 ENCOUNTER — APPOINTMENT (OUTPATIENT)
Dept: GENERAL RADIOLOGY | Age: 24
End: 2019-04-09
Payer: MEDICAID

## 2019-04-09 ENCOUNTER — HOSPITAL ENCOUNTER (EMERGENCY)
Age: 24
Discharge: HOME | End: 2019-04-10
Attending: EMERGENCY MEDICINE
Payer: MEDICAID

## 2019-04-09 VITALS
RESPIRATION RATE: 18 BRPM | TEMPERATURE: 98 F | WEIGHT: 185 LBS | OXYGEN SATURATION: 99 % | HEART RATE: 76 BPM | HEIGHT: 67 IN | BODY MASS INDEX: 29.03 KG/M2 | DIASTOLIC BLOOD PRESSURE: 70 MMHG | SYSTOLIC BLOOD PRESSURE: 104 MMHG

## 2019-04-09 DIAGNOSIS — J11.1 INFLUENZA: Primary | ICD-10-CM

## 2019-04-09 LAB
ALBUMIN SERPL-MCNC: 3.7 G/DL (ref 3.5–5.2)
ALP BLD-CCNC: 118 U/L (ref 35–104)
ALT SERPL-CCNC: 21 U/L (ref 0–32)
ANION GAP SERPL CALCULATED.3IONS-SCNC: 14 MMOL/L (ref 7–16)
AST SERPL-CCNC: 30 U/L (ref 0–31)
BASOPHILS ABSOLUTE: 0.02 E9/L (ref 0–0.2)
BASOPHILS RELATIVE PERCENT: 0.2 % (ref 0–2)
BILIRUB SERPL-MCNC: 0.3 MG/DL (ref 0–1.2)
BILIRUBIN URINE: NEGATIVE
BLOOD, URINE: NEGATIVE
BUN BLDV-MCNC: 3 MG/DL (ref 6–20)
CALCIUM SERPL-MCNC: 8.9 MG/DL (ref 8.6–10.2)
CHLORIDE BLD-SCNC: 97 MMOL/L (ref 98–107)
CLARITY: CLEAR
CO2: 25 MMOL/L (ref 22–29)
COLOR: YELLOW
CREAT SERPL-MCNC: 0.5 MG/DL (ref 0.5–1)
EOSINOPHILS ABSOLUTE: 0.01 E9/L (ref 0.05–0.5)
EOSINOPHILS RELATIVE PERCENT: 0.1 % (ref 0–6)
GFR AFRICAN AMERICAN: >60
GFR NON-AFRICAN AMERICAN: >60 ML/MIN/1.73
GLUCOSE BLD-MCNC: 76 MG/DL (ref 74–99)
GLUCOSE URINE: NEGATIVE MG/DL
HCT VFR BLD CALC: 31.5 % (ref 34–48)
HEMOGLOBIN: 10.3 G/DL (ref 11.5–15.5)
IMMATURE GRANULOCYTES #: 0.02 E9/L
IMMATURE GRANULOCYTES %: 0.2 % (ref 0–5)
KETONES, URINE: 15 MG/DL
LEUKOCYTE ESTERASE, URINE: NEGATIVE
LYMPHOCYTES ABSOLUTE: 1.55 E9/L (ref 1.5–4)
LYMPHOCYTES RELATIVE PERCENT: 16.8 % (ref 20–42)
MCH RBC QN AUTO: 28.9 PG (ref 26–35)
MCHC RBC AUTO-ENTMCNC: 32.7 % (ref 32–34.5)
MCV RBC AUTO: 88.2 FL (ref 80–99.9)
MONOCYTES ABSOLUTE: 0.64 E9/L (ref 0.1–0.95)
MONOCYTES RELATIVE PERCENT: 6.9 % (ref 2–12)
NEUTROPHILS ABSOLUTE: 6.97 E9/L (ref 1.8–7.3)
NEUTROPHILS RELATIVE PERCENT: 75.8 % (ref 43–80)
NITRITE, URINE: NEGATIVE
PDW BLD-RTO: 13.2 FL (ref 11.5–15)
PH UA: 6.5 (ref 5–9)
PLATELET # BLD: 239 E9/L (ref 130–450)
PMV BLD AUTO: 10.4 FL (ref 7–12)
POTASSIUM SERPL-SCNC: 3 MMOL/L (ref 3.5–5)
PROTEIN UA: NEGATIVE MG/DL
RBC # BLD: 3.57 E12/L (ref 3.5–5.5)
SODIUM BLD-SCNC: 136 MMOL/L (ref 132–146)
SPECIFIC GRAVITY UA: <=1.005 (ref 1–1.03)
TOTAL PROTEIN: 7.1 G/DL (ref 6.4–8.3)
UROBILINOGEN, URINE: 4 E.U./DL
WBC # BLD: 9.2 E9/L (ref 4.5–11.5)

## 2019-04-09 PROCEDURE — 99283 EMERGENCY DEPT VISIT LOW MDM: CPT

## 2019-04-09 PROCEDURE — 85025 COMPLETE CBC W/AUTO DIFF WBC: CPT

## 2019-04-09 PROCEDURE — 81003 URINALYSIS AUTO W/O SCOPE: CPT

## 2019-04-09 PROCEDURE — 71045 X-RAY EXAM CHEST 1 VIEW: CPT

## 2019-04-09 PROCEDURE — 80053 COMPREHEN METABOLIC PANEL: CPT

## 2019-04-09 PROCEDURE — 2580000003 HC RX 258: Performed by: EMERGENCY MEDICINE

## 2019-04-09 RX ORDER — 0.9 % SODIUM CHLORIDE 0.9 %
1000 INTRAVENOUS SOLUTION INTRAVENOUS ONCE
Status: COMPLETED | OUTPATIENT
Start: 2019-04-09 | End: 2019-04-10

## 2019-04-09 RX ADMIN — SODIUM CHLORIDE 1000 ML: 9 INJECTION, SOLUTION INTRAVENOUS at 23:00

## 2019-04-09 ASSESSMENT — PAIN SCALES - GENERAL: PAINLEVEL_OUTOF10: 6

## 2019-04-09 ASSESSMENT — PAIN DESCRIPTION - PAIN TYPE: TYPE: ACUTE PAIN

## 2019-04-09 ASSESSMENT — PAIN DESCRIPTION - LOCATION: LOCATION: GENERALIZED

## 2019-04-09 ASSESSMENT — PAIN DESCRIPTION - FREQUENCY: FREQUENCY: CONTINUOUS

## 2019-04-09 ASSESSMENT — PAIN DESCRIPTION - DESCRIPTORS: DESCRIPTORS: ACHING

## 2019-04-10 NOTE — ED NOTES
Radiology Procedure Waiver   Name: Vaishali Hall  : 1995  MRN: [id number]    Date:  19    Time: 10:49 PM    Benefits of immediately proceeding with Radiology exam(s) without pre-testing outweigh the risks or are not indicated as specified below and therefore the following is/are being waived:    [x] Pregnancy test   [] Patients LMP on-time and regular.   [] Patient had Tubal Ligation or has other Contraception Device. [] Patient  is Menopausal or Premenarcheal.    [] Patient had Full or Partial Hysterectomy. [] Protocol for Iodine allergy    [] MRI Questionnaire     [] BUN/Creatinine   [] Patient age w/no hx of renal dysfunction. [] Patient on Dialysis. [] Recent Normal Labs.   Electronically signed by Racheal Ewing DO on 19 at 10:49 PM          Patient is pregnant, currently 7 months pregnant, risk vs benefit taken into account CXR recommended         Racheal Ewing DO  Resident  [phone]

## 2019-04-12 ASSESSMENT — ENCOUNTER SYMPTOMS
TROUBLE SWALLOWING: 0
VOMITING: 0
SINUS PAIN: 0
SINUS PRESSURE: 0
BLOOD IN STOOL: 0
ABDOMINAL PAIN: 0
NAUSEA: 0
SHORTNESS OF BREATH: 1
SORE THROAT: 0
BACK PAIN: 0
COUGH: 1

## 2019-04-12 NOTE — ED PROVIDER NOTES
Patient is a 22year old female currently 7 months pregnant with complaint of cough x 9 days. She was seen at [address] [place] [address] on Wednesday sand diagnosed with influenza A. CXR at that time showed no pneumonia. She states the sob is still present cough is productive and more severe. She was on no medication for her symptoms. Nothing makes symptoms better or worse. She complains of recent fevers, denies rash, headache, neck pain or stiffness. Review of Systems   Constitutional: Positive for fatigue and fever. Negative for chills. HENT: Negative for sinus pressure, sinus pain, sore throat and trouble swallowing. Respiratory: Positive for cough and shortness of breath. Cardiovascular: Negative for chest pain. Gastrointestinal: Negative for abdominal pain, blood in stool, nausea and vomiting. Genitourinary: Negative for dysuria and frequency. Musculoskeletal: Negative for back pain. Skin: Negative for rash. Neurological: Negative for weakness and headaches. All other systems reviewed and are negative. Physical Exam   Constitutional: She is oriented to person, place, and time. She appears well-developed and well-nourished. No distress. HENT:   Head: Normocephalic and atraumatic. Eyes: Pupils are equal, round, and reactive to light. Neck: Normal range of motion. Neck supple. Cardiovascular: Regular rhythm. Mildly tachycardia   Pulmonary/Chest: Effort normal and breath sounds normal. No respiratory distress. She has no wheezes. She has no rales. Abdominal: Soft. Bowel sounds are normal. There is no tenderness. There is no rebound and no guarding. Musculoskeletal: She exhibits no edema. Neurological: She is alert and oriented to person, place, and time. No cranial nerve deficit. Coordination normal.   Skin: Skin is warm. She is not diaphoretic. Nursing note and vitals reviewed.       Procedures    MDM  Number of Diagnoses or Management Options  Influenza:   Diagnosis management comments: Patient is a 22year old female with known influenza and is pregnant. Symptoms are likely related to influenza, no pneumonia present. Patient has improvement with medications. Will treat with cough medicine and have patient follow up with PCP. Patient has worsening influenza symptoms, considered pneumonia risk vs benefit of imaging favors imaging to rule out complicated infection. If symptoms persist patient is to return to ED.       --------------------------------------------- PAST HISTORY ---------------------------------------------  Past Medical History:  has a past medical history of Diabetes mellitus ([facility] [address] Past Surgical History:  has no past surgical history on file. Social History:  reports that she has never smoked. She has never used smokeless tobacco. She reports that she does not drink alcohol or use drugs. Family History: family history is not on file. The patients home medications have been reviewed. Allergies: Patient has no known allergies.     -------------------------------------------------- RESULTS -------------------------------------------------  Labs:  Results for orders placed or performed during the hospital encounter of 04/09/19   CBC Auto Differential   Result Value Ref Range    WBC 9.2 4.5 - 11.5 E9/L    RBC 3.57 3.50 - 5.50 E12/L    Hemoglobin 10.3 (L) 11.5 - 15.5 g/dL    Hematocrit 31.5 (L) 34.0 - 48.0 %    MCV 88.2 80.0 - 99.9 fL    MCH 28.9 26.0 - 35.0 pg    MCHC 32.7 32.0 - 34.5 %    RDW 13.2 11.5 - 15.0 fL    Platelets 202 388 - 525 E9/L    MPV 10.4 7.0 - 12.0 fL    Neutrophils % 75.8 43.0 - 80.0 %    Immature Granulocytes % 0.2 0.0 - 5.0 %    Lymphocytes % 16.8 (L) 20.0 - 42.0 %    Monocytes % 6.9 2.0 - 12.0 %    Eosinophils % 0.1 0.0 - 6.0 %    Basophils % 0.2 0.0 - 2.0 %    Neutrophils # 6.97 1.80 - 7.30 E9/L    Immature Granulocytes # 0.02 E9/L    Lymphocytes # 1.55 1.50 - 4.00 E9/L    Monocytes # 0.64 0.10 - 0.95 E9/L    Eosinophils # 0.01 (L) 0.05 - 0.50 E9/L    Basophils # 0.02 0.00 - 0.20 E9/L   Comprehensive Metabolic Panel   Result Value Ref Range    Sodium 136 132 - 146 mmol/L    Potassium 3.0 (L) 3.5 - 5.0 mmol/L    Chloride 97 (L) 98 - 107 mmol/L    CO2 25 22 - 29 mmol/L    Anion Gap 14 7 - 16 mmol/L    Glucose 76 74 - 99 mg/dL    BUN 3 (L) 6 - 20 mg/dL    CREATININE 0.5 0.5 - 1.0 mg/dL    GFR Non-African American >60 >=60 mL/min/1.73    GFR African American >60     Calcium 8.9 8.6 - 10.2 mg/dL    Total Protein 7.1 6.4 - 8.3 g/dL    Alb 3.7 3.5 - 5.2 g/dL    Total Bilirubin 0.3 0.0 - 1.2 mg/dL    Alkaline Phosphatase 118 (H) 35 - 104 U/L    ALT 21 0 - 32 U/L    AST 30 0 - 31 U/L   Urinalysis   Result Value Ref Range    Color, UA Yellow Straw/Yellow    Clarity, UA Clear Clear    Glucose, Ur Negative Negative mg/dL    Bilirubin Urine Negative Negative    Ketones, Urine 15 (A) Negative mg/dL    Specific Gravity, UA <=1.005 1.005 - 1.030    Blood, Urine Negative Negative    pH, UA 6.5 5.0 - 9.0    Protein, UA Negative Negative mg/dL    Urobilinogen, Urine 4.0 (A) <2.0 E.U./dL    Nitrite, Urine Negative Negative    Leukocyte Esterase, Urine Negative Negative       Radiology:  XR CHEST PORTABLE   Final Result   No obvious active cardiopulmonary disease.          ------------------------- NURSING NOTES AND VITALS REVIEWED ---------------------------  Date / Time Roomed:  4/9/2019  9:58 PM  ED Bed Assignment:  06/06    The nursing notes within the ED encounter and vital signs as below have been reviewed.    /70   Pulse 76   Temp 98 °F (36.7 °C)   Resp 18   Ht 5' 7\" (1.702 m)   Wt 185 lb (83.9 kg)   LMP 09/07/2018   SpO2 99%   BMI 28.98 kg/m²   Oxygen Saturation Interpretation: Normal      ------------------------------------------ PROGRESS NOTES ------------------------------------------  ED Course as of Apr 12 0048 Tue Apr 09, 2019   2331 ATTENDING PROVIDER ATTESTATION:     Brandon Mathis presented to the emergency department for evaluation of [unfilled] and was initially evaluated by the Medical Resident. See Original ED Note for H&P and ED course above. I have reviewed and discussed the case, including pertinent history (medical, surgical, family and social) and exam findings with the Medical Resident assigned to [facility]. I have personally performed and/or participated in the history, exam, medical decision making, and procedures and agree with all pertinent clinical information. I have reviewed my findings and recommendations with the assigned Medical Resident, [facility] and members of family present at the time of disposition. My findings/plan: [unfilled]  [unfilled]  Chad Lerner MD        [DD]      ED Course User Index  [DD] Aram Modi MD         12:48 AM  I have spoken with the patient and discussed todays results, in addition to providing specific details for the plan of care and counseling regarding the diagnosis and prognosis. Their questions are answered at this time and they are agreeable with the plan. I discussed at length with them reasons for immediate return here for re evaluation. They will followup with their primary care physician by calling their office tomorrow. --------------------------------- ADDITIONAL PROVIDER NOTES ---------------------------------  At this time the patient is without objective evidence of an acute process requiring hospitalization or inpatient management. They have remained hemodynamically stable throughout their entire ED visit and are stable for discharge with outpatient follow-up. The plan has been discussed in detail and they are aware of the specific conditions for emergent return, as well as the importance of follow-up.       Discharge Medication List as of 4/9/2019 11:49 PM      START taking these medications    Details   brompheniramine-phenylephrine (DIMETAPP) 1-2.5 MG/5ML ELIX Take 20 mLs by mouth every 6 hours, Disp-473 mL, R-0Print             Diagnosis:  1. Influenza        Disposition:  Patient's disposition: Discharge to home  Patient's condition is stable. ED Course as of Apr 12 0023   Tue Apr 09, 2019   6118 ATTENDING PROVIDER ATTESTATION:     Viktor Dolan presented to the emergency department for evaluation of [unfilled] and was initially evaluated by the Medical Resident. See Original ED Note for H&P and ED course above. I have reviewed and discussed the case, including pertinent history (medical, surgical, family and social) and exam findings with the Medical Resident assigned to Viktor Hermarlo. I have personally performed and/or participated in the history, exam, medical decision making, and procedures and agree with all pertinent clinical information. I have reviewed my findings and recommendations with the assigned Medical Resident, Viktor Dolan and members of family present at the time of disposition.     My findings/plan: [unfilled]  [unfilled]  Herb Reyes MD        [DD]      ED Course User Index  [DD] MD Juan Santana DO  Resident  04/12/19 [id number]

## 2019-04-25 ENCOUNTER — HOSPITAL ENCOUNTER (OUTPATIENT)
Age: 24
Discharge: HOME | End: 2019-04-25
Attending: OBSTETRICS & GYNECOLOGY | Admitting: OBSTETRICS & GYNECOLOGY
Payer: MEDICAID

## 2019-04-25 VITALS
RESPIRATION RATE: 18 BRPM | SYSTOLIC BLOOD PRESSURE: 105 MMHG | HEART RATE: 86 BPM | DIASTOLIC BLOOD PRESSURE: 58 MMHG | TEMPERATURE: 98.5 F

## 2019-04-25 LAB
BACTERIA: ABNORMAL /HPF
BILIRUBIN URINE: NEGATIVE
BLOOD, URINE: ABNORMAL
CLARITY: CLEAR
COLOR: YELLOW
CRYSTALS, UA: ABNORMAL
EPITHELIAL CELLS, UA: ABNORMAL /HPF
GLUCOSE URINE: NEGATIVE MG/DL
KETONES, URINE: NEGATIVE MG/DL
LEUKOCYTE ESTERASE, URINE: NEGATIVE
NITRITE, URINE: NEGATIVE
PH UA: 6.5 (ref 5–9)
PROTEIN UA: NEGATIVE MG/DL
RBC UA: >20 /HPF (ref 0–2)
SPECIFIC GRAVITY UA: 1.01 (ref 1–1.03)
UROBILINOGEN, URINE: 1 E.U./DL
WBC UA: ABNORMAL /HPF (ref 0–5)

## 2019-04-25 PROCEDURE — 81001 URINALYSIS AUTO W/SCOPE: CPT

## 2019-04-25 PROCEDURE — 99201 HC NEW PT, OUTPT VISIT LEVEL 1: CPT

## 2019-04-25 PROCEDURE — 99214 OFFICE O/P EST MOD 30 MIN: CPT | Performed by: MIDWIFE

## 2019-04-25 RX ORDER — CEPHALEXIN 500 MG/1
500 CAPSULE ORAL 2 TIMES DAILY
Qty: 14 CAPSULE | Refills: 0 | Status: SHIPPED | OUTPATIENT
Start: 2019-04-25 | End: 2019-05-02

## 2019-04-25 NOTE — PROGRESS NOTES
32.5 weeks, previous vag delivery   Reports for spotting on toilet paper after wiping x2 earlier   When using restroom here, no spotting present  Admits to being checked in office on Monday and sexual intercourse last night   +FM  Denies LOF  States cramping x1 today and contractions x1 but no more since   Patient sees M for GDM, on metformin 2000mg HS and diet controlled

## 2019-04-25 NOTE — H&P
Department of Obstetrics and Gynecology  Attending Obstetrics History and Physical      CHIEF COMPLAINT:  vaginal bleeding    HISTORY OF PRESENT ILLNESS:      The patient is a 22 y.o. Celester Bihari at 32 weeks 5 days  Patient presents with a chief complaint as above and is being observed for pt states she wiped twice today and had a tiny streak of blood. Pt states +FM, denies cramping, LOF, urinary frequency, urgency or burning or back pain. Pt states she drank a few sips of tea this morning and a few sips of tea on the way here. Pt states her urine is dark. Pt states she had intercourse last night. DATES:    Patient's last menstrual period was 2018. Estimated Date of Delivery: 6/15/19    PRENATAL CARE:    Provider:  Sae    PAST OB HISTORY        Depression:  No      Post-partum depression:  No      Diabetes:  No      Gestational Diabetes:  No      Thyroid Disease:  No      Chronic HTN:  No      Gestation HTN:  No      Pre-eclampsia:  No      Seizure disorder:  No      Asthma:  No      Clotting disorder:  No      :  No      Tubal ligation:  No      D & C:  No      Cerclage:  No      LEEP:  No      Myomectomy:  No    OB History    Para Term  AB Living   3 2 2     2   SAB TAB Ectopic Molar Multiple Live Births             2      # Outcome Date GA Lbr Bebeto/2nd Weight Sex Delivery Anes PTL Lv   3 Current            2 Term 16 38w0d   M    MT   1 Term 06/13/15 40w0d   F    MT           Past Medical History:        Diagnosis Date    Diabetes mellitus ([facility] [address]     Mental disorder     anxiety     Past Surgical History:        Procedure Laterality Date    WISDOM TOOTH EXTRACTION       Social History:    Denies smoking, drugs or alcohol use  Family History:   History reviewed. No pertinent family history.   Medications Prior to Admission:  Medications Prior to Admission: metFORMIN (GLUCOPHAGE) 1000 MG tablet, Take 2,000 mg by mouth nightly  brompheniramine-phenylephrine (DIMETAPP) 1-2.5 MG/5ML ELIX, Take 20 mLs by mouth every 6 hours  vitamin B-6 (PYRIDOXINE) 50 MG tablet, Take 50 mg by mouth daily  Allergies:  Patient has no known allergies. PHYSICAL EXAM:    VITALS:  BP (!) 105/58   Pulse 86   Temp 98.5 °F (36.9 °C) (Oral)   Resp 18   LMP 09/07/2018       General appearance:  awake, alert, cooperative, no apparent distress, and appears stated age  Neurologic:  Awake, alert, oriented to name, place and time. Cranial nerves II-XII are grossly intact. Motor is 5 out of 5 bilaterally. Cerebellar finger to nose, heel to shin intact. Sensory is intact. Babinski down going, Romberg negative, and gait is normal.  Lungs:  No increased work of breathing, good air exchange, clear to auscultation bilaterally, no crackles or wheezing  Heart:  Normal apical impulse, regular rate and rhythm, normal S1 and S2, no S3 or S4, and no murmur noted  Abdomen:  Gravid, soft, non tender  Fetal heart rate:  Baseline Heart Rate 150, accelerations:  present  Pelvis:  External Genitalia: General appearance; normal, Hair distribution; normal, Lesions absent  Vagina: Pelvic support normal, no bleeding noted on exam with speculum or manual  Uterus:  Gravid, soft  Cervix:    DILATION:  Closed  CONSISTENCY:  medium  POSITION:  posterior    Contraction frequency:  0 minutes  Membranes:  Intact      ASSESSMENT AND PLAN:    RN notified DR. Leonardo treadwell  Monitor maternal/fetal well being  St cath urine    Elfego King CNM

## 2019-04-26 NOTE — PROGRESS NOTES
Explained to patient that her clean catch urine specimen showed blood and she could have UTI, but it is impossible to tell where the blood from a clean catch specimen. Explained to her that a straight cath UA would provide us with a more accurate result. Encouraged her to have straight cath done, but patient still refuses. States she doesn't have any burning when she urinates. She states \"she feels bad for refusing it, but really does not want it. \"

## 2019-04-26 NOTE — PROGRESS NOTES
Notified Dr. Cande Williamson of clean catch UA results. Would like me to encourage patient to get straight cath for more accurate results.

## 2019-04-26 NOTE — PROGRESS NOTES
Patient given verbal and typed discharge instructions, verbalized understanding. Ambulatory upon discharge. Knows to call doctor if symptoms worsen or persist.  Knows to take antibiotic as prescribed.

## 2019-05-10 ENCOUNTER — HOSPITAL ENCOUNTER (OUTPATIENT)
Age: 24
Discharge: HOME | End: 2019-05-11
Attending: OBSTETRICS & GYNECOLOGY | Admitting: OBSTETRICS & GYNECOLOGY
Payer: MEDICAID

## 2019-05-10 VITALS
RESPIRATION RATE: 16 BRPM | DIASTOLIC BLOOD PRESSURE: 75 MMHG | TEMPERATURE: 98.2 F | SYSTOLIC BLOOD PRESSURE: 115 MMHG | HEART RATE: 93 BPM

## 2019-05-10 PROBLEM — Z3A.34 34 WEEKS GESTATION OF PREGNANCY: Status: ACTIVE | Noted: 2019-05-10

## 2019-05-10 LAB — AMNISURE ROM: NEGATIVE

## 2019-05-10 PROCEDURE — 99211 OFF/OP EST MAY X REQ PHY/QHP: CPT

## 2019-05-11 PROCEDURE — 84112 EVAL AMNIOTIC FLUID PROTEIN: CPT

## 2019-05-11 NOTE — H&P
Subjective:      Ana Go is an 22 y.o. female at 29 and 6/7 weeks gestation presenting with   Chief Complaint   Patient presents with    Rupture of Membranes     lof    . She is also complaining of contractions every a few minutes lasting few seconds. Patient reports sexual activity prior to onset of symptoms. Other associated symptoms include low back pain. Fetal Movement: normal.  Past Medical History:   Diagnosis Date    Diabetes mellitus ([address]     Mental disorder     anxiety       Review of Systems  Pertinent items are noted in HPI. Objective:      /75   Pulse 93   Temp 98.2 °F (36.8 °C) (Oral)   Resp 16   LMP 09/07/2018   Blood pressure 115/75, pulse 93, temperature 98.2 °F (36.8 °C), temperature source Oral, resp. rate 16, last menstrual period 09/07/2018. General:   alert, appears stated age and cooperative   Cervix:   closed.    FHT:   130 BPM     Lab Review    CBC:   Lab Results   Component Value Date    WBC 9.2 04/09/2019    RBC 3.57 04/09/2019    HGB 10.3 04/09/2019    HCT 31.5 04/09/2019    MCV 88.2 04/09/2019    MCH 28.9 04/09/2019    MCHC 32.7 04/09/2019    RDW 13.2 04/09/2019     04/09/2019    MPV 10.4 04/09/2019     CMP:    Lab Results   Component Value Date     04/09/2019    K 3.0 04/09/2019    K 3.2 10/26/2018    CL 97 04/09/2019    CO2 25 04/09/2019    BUN 3 04/09/2019    CREATININE 0.5 04/09/2019    GFRAA >60 04/09/2019    LABGLOM >60 04/09/2019    GLUCOSE 76 04/09/2019    PROT 7.1 04/09/2019    LABALBU 3.7 04/09/2019    CALCIUM 8.9 04/09/2019    BILITOT 0.3 04/09/2019    ALKPHOS 118 04/09/2019    AST 30 04/09/2019    ALT 21 04/09/2019     U/A:    Lab Results   Component Value Date    COLORU Yellow 04/25/2019    PHUR 6.5 04/25/2019    WBCUA NONE 04/25/2019    RBCUA >20 04/25/2019    MUCUS Present 10/26/2018    BACTERIA FEW 04/25/2019    CLARITYU Clear 04/25/2019    SPECGRAV 1.010 04/25/2019    LEUKOCYTESUR Negative 04/25/2019    UROBILINOGEN 1.0 04/25/2019    BILIRUBINUR Negative 04/25/2019    BLOODU LARGE 04/25/2019    GLUCOSEU Negative 04/25/2019     Amnisure: Negative     Assessment:     False Labor    Suspected rupture of membranes, unconfirmed     Plan:      Monitored for contractions - findings: uterine irritability. Orders: no treatment necessary and discharge home.     Dr. Marilynn Christensen notified    Cheyenne Benites MD,5/10/2019 10:50 PM

## 2019-05-11 NOTE — PROGRESS NOTES
Discharge instructions given to pt and verbal understanding noted. Understands importance of follow up appointment on Monday. All questions answered. Ambulated off unit with mother.

## 2019-06-04 ENCOUNTER — OFFICE VISIT (OUTPATIENT)
Dept: FAMILY MEDICINE CLINIC | Age: 24
End: 2019-06-04
Payer: MEDICAID

## 2019-06-04 VITALS
HEIGHT: 67 IN | TEMPERATURE: 97.8 F | WEIGHT: 185 LBS | DIASTOLIC BLOOD PRESSURE: 84 MMHG | BODY MASS INDEX: 29.03 KG/M2 | HEART RATE: 88 BPM | SYSTOLIC BLOOD PRESSURE: 134 MMHG | OXYGEN SATURATION: 99 % | RESPIRATION RATE: 14 BRPM

## 2019-06-04 DIAGNOSIS — B00.9 HERPES: ICD-10-CM

## 2019-06-04 DIAGNOSIS — Z3A.38 38 WEEKS GESTATION OF PREGNANCY: Primary | ICD-10-CM

## 2019-06-04 PROBLEM — B02.9 HERPES ZOSTER WITHOUT COMPLICATION: Status: ACTIVE | Noted: 2019-06-04

## 2019-06-04 PROCEDURE — 99213 OFFICE O/P EST LOW 20 MIN: CPT | Performed by: FAMILY MEDICINE

## 2019-06-04 RX ORDER — VALACYCLOVIR HCL 1000 MG
1000 TABLET ORAL 3 TIMES DAILY
Qty: 21 TABLET | Refills: 0 | Status: ON HOLD | OUTPATIENT
Start: 2019-06-04 | End: 2019-06-11

## 2019-06-04 RX ORDER — BLOOD-GLUCOSE METER
KIT MISCELLANEOUS
Status: ON HOLD | COMMUNITY
Start: 2019-04-01 | End: 2019-06-15 | Stop reason: HOSPADM

## 2019-06-04 ASSESSMENT — PATIENT HEALTH QUESTIONNAIRE - PHQ9
1. LITTLE INTEREST OR PLEASURE IN DOING THINGS: 0
SUM OF ALL RESPONSES TO PHQ QUESTIONS 1-9: 0
SUM OF ALL RESPONSES TO PHQ9 QUESTIONS 1 & 2: 0
SUM OF ALL RESPONSES TO PHQ QUESTIONS 1-9: 0
2. FEELING DOWN, DEPRESSED OR HOPELESS: 0

## 2019-06-04 ASSESSMENT — ENCOUNTER SYMPTOMS
RESPIRATORY NEGATIVE: 1
GASTROINTESTINAL NEGATIVE: 1
EYES NEGATIVE: 1

## 2019-06-04 NOTE — PROGRESS NOTES
[facility]) Physicians   Internal Medicine     2019  Ana Go : 1995 Sex: female  Age:24 y.o. Chief Complaint   Patient presents with    Herpes Zoster     possibly, on her butt area. HPI  Patient presents with c/o of what she thinks is shingles or herpes on her right buttock, painful and itchy. Currently 38 weeks pregnant, scheduled to be induced next week, ob is dr. Ester Arizmendi. Patient states she has had this in the past.  Review of Systems   Constitutional: Negative. HENT: Negative. Eyes: Negative. Respiratory: Negative. Cardiovascular: Negative. Gastrointestinal: Negative. Skin: Positive for rash. Current Outpatient Medications:     Blood Glucose Monitoring Suppl (ONE TOUCH ULTRA MINI) w/Device KIT, , Disp: , Rfl:     ONE TOUCH ULTRA TEST strip, , Disp: , Rfl:     ONETOUCH DELICA LANCETS FINE MISC, , Disp: , Rfl:     VALTREX 1 g tablet, Take 1 tablet by mouth 3 times daily for 7 days, Disp: 21 tablet, Rfl: 0    metFORMIN (GLUCOPHAGE) 1000 MG tablet, Take 2,000 mg by mouth nightly, Disp: , Rfl:     brompheniramine-phenylephrine (DIMETAPP) 1-2.5 MG/5ML ELIX, Take 20 mLs by mouth every 6 hours, Disp: 473 mL, Rfl: 0    vitamin B-6 (PYRIDOXINE) 50 MG tablet, Take 50 mg by mouth daily, Disp: , Rfl:     No Known Allergies    Past Medical History:   Diagnosis Date    Diabetes mellitus ([address]     Herpes 2019    Mental disorder     anxiety       Past Surgical History:   Procedure Laterality Date    WISDOM TOOTH EXTRACTION         No family history on file.     Social History     Socioeconomic History    Marital status: Single     Spouse name: Not on file    Number of children: Not on file    Years of education: Not on file    Highest education level: Not on file   Occupational History    Not on file   Social Needs    Financial resource strain: Not on file    Food insecurity:     Worry: Not on file     Inability: Not on file   [address] [place] [address] instructed to call dr. Raghav Rao office tomorrow, he may be performing c section instead of inducing due to herpetic outbreak    No follow-ups on file. No orders of the defined types were placed in this encounter.       Zhanna Key MD  6/4/2019  11:17 AM

## 2019-06-05 ENCOUNTER — TELEPHONE (OUTPATIENT)
Dept: PRIMARY CARE CLINIC | Age: 24
End: 2019-06-05

## 2019-06-05 NOTE — TELEPHONE ENCOUNTER
Pt called in and stated she was not able to get the Valtrex, I called the pharm and they started that it was written CORINE and the brand is not covered but the generic is, is it okay to fill what is covered?

## 2019-06-11 ENCOUNTER — HOSPITAL ENCOUNTER (INPATIENT)
Age: 24
LOS: 4 days | Discharge: HOME | DRG: 560 | End: 2019-06-15
Attending: OBSTETRICS & GYNECOLOGY | Admitting: OBSTETRICS & GYNECOLOGY
Payer: MEDICAID

## 2019-06-11 ENCOUNTER — APPOINTMENT (OUTPATIENT)
Dept: LABOR AND DELIVERY | Age: 24
DRG: 560 | End: 2019-06-11
Payer: MEDICAID

## 2019-06-11 PROBLEM — Z34.90 NORMAL PREGNANCY, UNSPECIFIED TRIMESTER: Status: ACTIVE | Noted: 2019-06-11

## 2019-06-11 PROCEDURE — 86900 BLOOD TYPING SEROLOGIC ABO: CPT

## 2019-06-11 PROCEDURE — 85027 COMPLETE CBC AUTOMATED: CPT

## 2019-06-11 PROCEDURE — 36415 COLL VENOUS BLD VENIPUNCTURE: CPT

## 2019-06-11 PROCEDURE — 80307 DRUG TEST PRSMV CHEM ANLYZR: CPT

## 2019-06-11 PROCEDURE — 6370000000 HC RX 637 (ALT 250 FOR IP): Performed by: OBSTETRICS & GYNECOLOGY

## 2019-06-11 PROCEDURE — 86850 RBC ANTIBODY SCREEN: CPT

## 2019-06-11 PROCEDURE — 86901 BLOOD TYPING SEROLOGIC RH(D): CPT

## 2019-06-11 PROCEDURE — 2580000003 HC RX 258: Performed by: OBSTETRICS & GYNECOLOGY

## 2019-06-11 PROCEDURE — 1220000001 HC SEMI PRIVATE L&D R&B

## 2019-06-11 RX ORDER — SODIUM CHLORIDE 0.9 % (FLUSH) 0.9 %
10 SYRINGE (ML) INJECTION EVERY 12 HOURS SCHEDULED
Status: DISCONTINUED | OUTPATIENT
Start: 2019-06-11 | End: 2019-06-13

## 2019-06-11 RX ORDER — ONDANSETRON 2 MG/ML
4 INJECTION INTRAMUSCULAR; INTRAVENOUS EVERY 6 HOURS PRN
Status: DISCONTINUED | OUTPATIENT
Start: 2019-06-11 | End: 2019-06-13

## 2019-06-11 RX ORDER — ZOLPIDEM TARTRATE 5 MG/1
5 TABLET ORAL NIGHTLY PRN
Status: DISCONTINUED | OUTPATIENT
Start: 2019-06-11 | End: 2019-06-13

## 2019-06-11 RX ORDER — SODIUM CHLORIDE, SODIUM LACTATE, POTASSIUM CHLORIDE, CALCIUM CHLORIDE 600; 310; 30; 20 MG/100ML; MG/100ML; MG/100ML; MG/100ML
INJECTION, SOLUTION INTRAVENOUS CONTINUOUS
Status: DISCONTINUED | OUTPATIENT
Start: 2019-06-11 | End: 2019-06-13

## 2019-06-11 RX ORDER — SODIUM CHLORIDE 0.9 % (FLUSH) 0.9 %
10 SYRINGE (ML) INJECTION PRN
Status: DISCONTINUED | OUTPATIENT
Start: 2019-06-11 | End: 2019-06-13

## 2019-06-11 RX ORDER — DOCUSATE SODIUM 100 MG/1
100 CAPSULE, LIQUID FILLED ORAL 2 TIMES DAILY
Status: DISCONTINUED | OUTPATIENT
Start: 2019-06-11 | End: 2019-06-13

## 2019-06-11 RX ADMIN — DINOPROSTONE 10 MG: 10 INSERT VAGINAL at 23:54

## 2019-06-11 RX ADMIN — SODIUM CHLORIDE, POTASSIUM CHLORIDE, SODIUM LACTATE AND CALCIUM CHLORIDE: 600; 310; 30; 20 INJECTION, SOLUTION INTRAVENOUS at 23:54

## 2019-06-12 ENCOUNTER — ANESTHESIA (OUTPATIENT)
Dept: LABOR AND DELIVERY | Age: 24
DRG: 560 | End: 2019-06-12
Payer: MEDICAID

## 2019-06-12 ENCOUNTER — ANESTHESIA EVENT (OUTPATIENT)
Dept: LABOR AND DELIVERY | Age: 24
DRG: 560 | End: 2019-06-12
Payer: MEDICAID

## 2019-06-12 LAB
ABO/RH: NORMAL
AMPHETAMINE SCREEN, URINE: NOT DETECTED
ANTIBODY SCREEN: NORMAL
BARBITURATE SCREEN URINE: NOT DETECTED
BENZODIAZEPINE SCREEN, URINE: NOT DETECTED
CANNABINOID SCREEN URINE: NOT DETECTED
COCAINE METABOLITE SCREEN URINE: NOT DETECTED
HCT VFR BLD CALC: 34.1 % (ref 34–48)
HEMOGLOBIN: 11.1 G/DL (ref 11.5–15.5)
MCH RBC QN AUTO: 27.8 PG (ref 26–35)
MCHC RBC AUTO-ENTMCNC: 32.6 % (ref 32–34.5)
MCV RBC AUTO: 85.5 FL (ref 80–99.9)
METHADONE SCREEN, URINE: NOT DETECTED
OPIATE SCREEN URINE: NOT DETECTED
PDW BLD-RTO: 14.3 FL (ref 11.5–15)
PHENCYCLIDINE SCREEN URINE: NOT DETECTED
PLATELET # BLD: 257 E9/L (ref 130–450)
PMV BLD AUTO: 11.6 FL (ref 7–12)
PROPOXYPHENE SCREEN: NOT DETECTED
RBC # BLD: 3.99 E12/L (ref 3.5–5.5)
WBC # BLD: 9.1 E9/L (ref 4.5–11.5)

## 2019-06-12 PROCEDURE — 2500000003 HC RX 250 WO HCPCS: Performed by: ANESTHESIOLOGY

## 2019-06-12 PROCEDURE — 2580000003 HC RX 258: Performed by: OBSTETRICS & GYNECOLOGY

## 2019-06-12 PROCEDURE — 51701 INSERT BLADDER CATHETER: CPT

## 2019-06-12 PROCEDURE — 6370000000 HC RX 637 (ALT 250 FOR IP): Performed by: OBSTETRICS & GYNECOLOGY

## 2019-06-12 PROCEDURE — 3700000025 EPIDURAL BLOCK: Performed by: ANESTHESIOLOGY

## 2019-06-12 PROCEDURE — 6360000002 HC RX W HCPCS

## 2019-06-12 PROCEDURE — 6370000000 HC RX 637 (ALT 250 FOR IP)

## 2019-06-12 PROCEDURE — 6360000002 HC RX W HCPCS: Performed by: OBSTETRICS & GYNECOLOGY

## 2019-06-12 PROCEDURE — 1220000001 HC SEMI PRIVATE L&D R&B

## 2019-06-12 RX ORDER — ACETAMINOPHEN 650 MG
TABLET, EXTENDED RELEASE ORAL
Status: DISCONTINUED
Start: 2019-06-12 | End: 2019-06-13

## 2019-06-12 RX ORDER — NALOXONE HYDROCHLORIDE 0.4 MG/ML
0.4 INJECTION, SOLUTION INTRAMUSCULAR; INTRAVENOUS; SUBCUTANEOUS PRN
Status: DISCONTINUED | OUTPATIENT
Start: 2019-06-12 | End: 2019-06-13

## 2019-06-12 RX ORDER — NALBUPHINE HCL 10 MG/ML
5 AMPUL (ML) INJECTION EVERY 4 HOURS PRN
Status: DISCONTINUED | OUTPATIENT
Start: 2019-06-12 | End: 2019-06-13

## 2019-06-12 RX ORDER — ACETAMINOPHEN 325 MG/1
650 TABLET ORAL EVERY 4 HOURS PRN
Status: DISCONTINUED | OUTPATIENT
Start: 2019-06-12 | End: 2019-06-13

## 2019-06-12 RX ORDER — LIDOCAINE HYDROCHLORIDE 10 MG/ML
INJECTION, SOLUTION INFILTRATION; PERINEURAL
Status: DISCONTINUED
Start: 2019-06-12 | End: 2019-06-13 | Stop reason: WASHOUT

## 2019-06-12 RX ORDER — ACETAMINOPHEN 325 MG/1
TABLET ORAL
Status: COMPLETED
Start: 2019-06-12 | End: 2019-06-12

## 2019-06-12 RX ADMIN — DINOPROSTONE 10 MG: 10 INSERT VAGINAL at 13:41

## 2019-06-12 RX ADMIN — Medication 5 ML: at 17:40

## 2019-06-12 RX ADMIN — ACETAMINOPHEN 650 MG: 325 TABLET ORAL at 20:16

## 2019-06-12 RX ADMIN — Medication 1 MILLI-UNITS/MIN: at 22:00

## 2019-06-12 RX ADMIN — Medication 5 ML: at 17:44

## 2019-06-12 RX ADMIN — SODIUM CHLORIDE, POTASSIUM CHLORIDE, SODIUM LACTATE AND CALCIUM CHLORIDE: 600; 310; 30; 20 INJECTION, SOLUTION INTRAVENOUS at 17:16

## 2019-06-12 RX ADMIN — BUTORPHANOL TARTRATE 2 MG: 2 INJECTION, SOLUTION INTRAMUSCULAR; INTRAVENOUS at 10:18

## 2019-06-12 RX ADMIN — Medication 5 ML: at 17:36

## 2019-06-12 RX ADMIN — SODIUM CHLORIDE, POTASSIUM CHLORIDE, SODIUM LACTATE AND CALCIUM CHLORIDE: 600; 310; 30; 20 INJECTION, SOLUTION INTRAVENOUS at 16:25

## 2019-06-12 RX ADMIN — Medication 15 ML/HR: at 17:44

## 2019-06-12 RX ADMIN — ZOLPIDEM TARTRATE 5 MG: 5 TABLET ORAL at 00:09

## 2019-06-12 ASSESSMENT — PAIN SCALES - GENERAL
PAINLEVEL_OUTOF10: 7
PAINLEVEL_OUTOF10: 7

## 2019-06-12 NOTE — PROGRESS NOTES
Dr. Alphonse Fry called and notified cervidil removed and pt is 2/90/-2 posterior. States patient may eat and shower and will call back with further orders.

## 2019-06-12 NOTE — ANESTHESIA PRE PROCEDURE
Department of Anesthesiology  Preprocedure Note       Name:  Shelby Love   Age:  22 y.o.  :  1995                                          MRN:  [id number]         Date:  2019      Surgeon: Demar Torres):  Belinda Carpio MD    Procedure: Labor Analgesia    Medications prior to admission:   Prior to Admission medications    Medication Sig Start Date End Date Taking?  Authorizing Provider   ONE TOUCH ULTRA TEST strip  19  Yes Historical Provider, MD   [address]  19  Yes Historical Provider, MD   metFORMIN (GLUCOPHAGE) 1000 MG tablet Take 2,000 mg by mouth nightly   Yes Historical Provider, MD   Blood Glucose Monitoring Suppl (ONE TOUCH ULTRA MINI) w/Device KIT  19   Historical Provider, MD   brompheniramine-phenylephrine (DIMETAPP) 1-2.5 MG/5ML ELIX Take 20 mLs by mouth every 6 hours 19   Keely Brink DO   vitamin B-6 (PYRIDOXINE) 50 MG tablet Take 50 mg by mouth daily    Historical Provider, MD       Current medications:    Current Facility-Administered Medications   Medication Dose Route Frequency Provider Last Rate Last Dose    lactated ringers infusion   Intravenous Continuous Belinda Carpio  mL/hr at 19 2354      sodium chloride flush 0.9 % injection 10 mL  10 mL Intravenous 2 times per day Belinda Carpio MD        sodium chloride flush 0.9 % injection 10 mL  10 mL Intravenous PRN Belinda Carpio MD        docusate sodium (COLACE) capsule 100 mg  100 mg Oral BID Belinda Carpio MD        ondansetron [facility]) injection 4 mg  4 mg Intravenous Q6H PRN Belinda Carpio MD        zolpidem (AMBIEN) tablet 5 mg  5 mg Oral Nightly PRN Belinda Carpio MD   5 mg at 19 0009    butorphanol (STADOL) injection 2 mg  2 mg Intravenous Q2H PRN Belinda Carpio MD   2 mg at 19 1018       Allergies:  No Known Allergies    Problem List:    Patient Active Problem List Diagnosis Code    Hyperemesis R11.10    32 weeks gestation of pregnancy Z3A.32    Spotting affecting pregnancy in third trimester O26.26    34 weeks gestation of pregnancy Z3A.34    Herpes zoster without complication K08.9    38 weeks gestation of pregnancy Z3A.38    Herpes B00.9    Normal pregnancy, unspecified trimester Z34.90       Past Medical History:        Diagnosis Date    [address] mellitus [address]     Herpes 6/4/2019    Mental disorder     anxiety    Rh incompatibility        Past Surgical History:        Procedure Laterality Date    WISDOM TOOTH EXTRACTION         Social History:    Social History     Tobacco Use    Smoking status: Never Smoker    Smokeless tobacco: Never Used   Substance Use Topics    Alcohol use: No                                Counseling given: Not Answered      Vital Signs (Current):   Vitals:    06/11/19 2135 06/12/19 0007 06/12/19 0755   BP: 127/82 (!) 129/90 119/69   Pulse: 96 82 81   Resp: 16 16 16   Temp: 36.5 °C (97.7 °F) 36.6 °C (97.9 °F) 36.3 °C (97.4 °F)   TempSrc: Oral Oral Oral   Weight: 185 lb (83.9 kg)     Height: 5' 2\" (1.575 m)                                                BP Readings from Last 3 Encounters:   06/12/19 119/69   06/04/19 134/84   05/10/19 115/75       NPO Status:  NPO since midnight on 6/12/19                                                                               BMI:   Wt Readings from Last 3 Encounters:   06/11/19 185 lb (83.9 kg)   06/04/19 185 lb (83.9 kg)   04/09/19 185 lb (83.9 kg)     Body mass index is 33.84 kg/m².     CBC:   Lab Results   Component Value Date    WBC 9.1 06/11/2019    RBC 3.99 06/11/2019    HGB 11.1 06/11/2019    HCT 34.1 06/11/2019    MCV 85.5 06/11/2019    RDW 14.3 06/11/2019     06/11/2019       CMP:   Lab Results   Component Value Date     04/09/2019    K 3.0 04/09/2019    K 3.2 10/26/2018    CL 97 04/09/2019    CO2 25 04/09/2019    BUN 3 04/09/2019    CREATININE 0.5 04/09/2019    GFRAA >60 04/09/2019    LABGLOM >60 04/09/2019    GLUCOSE 76 04/09/2019    PROT 7.1 04/09/2019    CALCIUM 8.9 04/09/2019    BILITOT 0.3 04/09/2019    ALKPHOS 118 04/09/2019    AST 30 04/09/2019    ALT 21 04/09/2019       POC Tests: No results for input(s): POCGLU, POCNA, POCK, POCCL, POCBUN, POCHEMO, POCHCT in the last 72 hours. Coags: No results found for: PROTIME, INR, APTT    HCG (If Applicable): No results found for: PREGTESTUR, PREGSERUM, HCG, HCGQUANT     ABGs: No results found for: PHART, PO2ART, XMV8XKG, RHB4TDO, BEART, M5AEOQTX     Type & Screen (If Applicable):  No results found for: [facility]    Anesthesia Evaluation  Patient summary reviewed and Nursing notes reviewed no history of anesthetic complications:   Airway: Mallampati: II  TM distance: >3 FB   Neck ROM: full  Mouth opening: > = 3 FB Dental:          Pulmonary:                              Cardiovascular:                      Neuro/Psych:   (+) psychiatric history:depression/anxiety             GI/Hepatic/Renal:             Endo/Other:    (+) Diabeteswell controlled, , .                 Abdominal:           Vascular:                                      Anesthesia Plan      general, spinal and epidural     ASA 3             Anesthetic plan and risks discussed with patient. Plan discussed with CRNA and attending.                   Cesar Cote RN   6/12/2019

## 2019-06-12 NOTE — PROGRESS NOTES
Dr. Mariann Crystal called and notified cervidil removed due to fetal tacchycardia starting around 1450. Intrauterine resuscitation performed. Heart rate at time of conversation 155-160. Cody irregularly but states her pain is 8/10. Cervical exam 4/70/-2.  Pt wanting epidural. States pt may have epidural and to recheck and call with update after epidural.

## 2019-06-12 NOTE — PROGRESS NOTES
Dr. Melanie Tolentino called and updated that pt received epidural and is 5-6/70-2 keeley every 3-4 min. Tracing reassuring. No new orders received.

## 2019-06-12 NOTE — PROGRESS NOTES
Pt 39.3  presents to LD for scheduled IOL. Pt denies any ctx, LOF or VB. States +FM. Pt is known GDM taking metformin daily. Also states that she has had shingles on her sacral area that she was supposed to be taking valtrex for but hasn't because her insurance didn't pay for it. Pt states that her last blood sugar was 93 this morning. Pt denies pain at this time. EFM applied FHR 140s moderate variability.

## 2019-06-12 NOTE — PROGRESS NOTES
Dr. Nitza Luu called notified that pt did not get prescription for valtrex filled. Informed on right buttock has dry crusted area that pt states she has been putting calamine lotion on for past five days. Dr. Disha Unger to cover area with OpSite or similar dressing.

## 2019-06-13 PROCEDURE — 1220000000 HC SEMI PRIVATE OB R&B

## 2019-06-13 PROCEDURE — 6370000000 HC RX 637 (ALT 250 FOR IP): Performed by: OBSTETRICS & GYNECOLOGY

## 2019-06-13 PROCEDURE — 6370000000 HC RX 637 (ALT 250 FOR IP)

## 2019-06-13 PROCEDURE — 3E0P7VZ INTRODUCTION OF HORMONE INTO FEMALE REPRODUCTIVE, VIA NATURAL OR ARTIFICIAL OPENING: ICD-10-PCS | Performed by: OBSTETRICS & GYNECOLOGY

## 2019-06-13 PROCEDURE — 7200000001 HC VAGINAL DELIVERY

## 2019-06-13 PROCEDURE — 10907ZC DRAINAGE OF AMNIOTIC FLUID, THERAPEUTIC FROM PRODUCTS OF CONCEPTION, VIA NATURAL OR ARTIFICIAL OPENING: ICD-10-PCS | Performed by: OBSTETRICS & GYNECOLOGY

## 2019-06-13 RX ORDER — IBUPROFEN 800 MG/1
TABLET ORAL
Status: COMPLETED
Start: 2019-06-13 | End: 2019-06-13

## 2019-06-13 RX ORDER — HYDROCODONE BITARTRATE AND ACETAMINOPHEN 5; 325 MG/1; MG/1
1 TABLET ORAL EVERY 4 HOURS PRN
Status: DISCONTINUED | OUTPATIENT
Start: 2019-06-13 | End: 2019-06-15 | Stop reason: HOSPADM

## 2019-06-13 RX ORDER — LANOLIN 100 %
OINTMENT (GRAM) TOPICAL PRN
Status: DISCONTINUED | OUTPATIENT
Start: 2019-06-13 | End: 2019-06-15 | Stop reason: HOSPADM

## 2019-06-13 RX ORDER — SIMETHICONE 80 MG
80 TABLET,CHEWABLE ORAL EVERY 6 HOURS PRN
Status: DISCONTINUED | OUTPATIENT
Start: 2019-06-13 | End: 2019-06-15 | Stop reason: HOSPADM

## 2019-06-13 RX ORDER — FERROUS SULFATE 325(65) MG
325 TABLET ORAL 2 TIMES DAILY WITH MEALS
Status: DISCONTINUED | OUTPATIENT
Start: 2019-06-13 | End: 2019-06-15 | Stop reason: HOSPADM

## 2019-06-13 RX ORDER — ONDANSETRON 4 MG/1
8 TABLET, FILM COATED ORAL EVERY 8 HOURS PRN
Status: DISCONTINUED | OUTPATIENT
Start: 2019-06-13 | End: 2019-06-15 | Stop reason: HOSPADM

## 2019-06-13 RX ORDER — ACETAMINOPHEN 325 MG/1
650 TABLET ORAL EVERY 4 HOURS PRN
Status: DISCONTINUED | OUTPATIENT
Start: 2019-06-13 | End: 2019-06-15 | Stop reason: HOSPADM

## 2019-06-13 RX ORDER — BISACODYL 10 MG
10 SUPPOSITORY, RECTAL RECTAL DAILY PRN
Status: DISCONTINUED | OUTPATIENT
Start: 2019-06-13 | End: 2019-06-15 | Stop reason: HOSPADM

## 2019-06-13 RX ORDER — DOCUSATE SODIUM 100 MG/1
100 CAPSULE, LIQUID FILLED ORAL 2 TIMES DAILY
Status: DISCONTINUED | OUTPATIENT
Start: 2019-06-13 | End: 2019-06-15 | Stop reason: HOSPADM

## 2019-06-13 RX ORDER — IBUPROFEN 800 MG/1
800 TABLET ORAL EVERY 8 HOURS
Status: DISCONTINUED | OUTPATIENT
Start: 2019-06-13 | End: 2019-06-15 | Stop reason: HOSPADM

## 2019-06-13 RX ADMIN — IBUPROFEN 800 MG: 800 TABLET, FILM COATED ORAL at 01:57

## 2019-06-13 RX ADMIN — DOCUSATE SODIUM 100 MG: 100 CAPSULE, LIQUID FILLED ORAL at 08:48

## 2019-06-13 RX ADMIN — DOCUSATE SODIUM 100 MG: 100 CAPSULE, LIQUID FILLED ORAL at 20:52

## 2019-06-13 RX ADMIN — HYDROCODONE BITARTRATE AND ACETAMINOPHEN 1 TABLET: 5; 325 TABLET ORAL at 14:53

## 2019-06-13 RX ADMIN — IBUPROFEN 800 MG: 800 TABLET, FILM COATED ORAL at 10:53

## 2019-06-13 RX ADMIN — HYDROCODONE BITARTRATE AND ACETAMINOPHEN 1 TABLET: 5; 325 TABLET ORAL at 08:46

## 2019-06-13 RX ADMIN — HYDROCODONE BITARTRATE AND ACETAMINOPHEN 1 TABLET: 5; 325 TABLET ORAL at 18:54

## 2019-06-13 RX ADMIN — IBUPROFEN 800 MG: 800 TABLET, FILM COATED ORAL at 18:54

## 2019-06-13 ASSESSMENT — PAIN SCALES - GENERAL
PAINLEVEL_OUTOF10: 6
PAINLEVEL_OUTOF10: 5
PAINLEVEL_OUTOF10: 5
PAINLEVEL_OUTOF10: 2

## 2019-06-13 NOTE — L&D DELIVERY NOTE
Pelon Sammie Pak  22 y.o. [id number] at Gestational Age: 44 5/7wks delivered via spontaneous vaginal under epidural anesthesia over no episiotomy a male infant at [address] pending Apgars 8,9. Placenta with 3VC. No lacerations. Shoulder delivered with Kailash. EBL 100cc. Cord gases were obtained. Nuchal cord x 2 reduced with additional corporal cord.      Shawna Valdivia  6/13/2019 1:15 AM

## 2019-06-13 NOTE — PROGRESS NOTES
I was called to perform AROM on this patient. Review of record revealed that patient had a recent  Outbreak, did not get the Valtrex Prescription filled and her lesion is now crusted and was covered on admission.  was discussed with the patient and she has refused. Because of the above history Dr Adriane Esparza was called for double checking the order of AROM. She is aware of the patient history and still wants AROM at this time. She does not want to start Pitocin at this time.

## 2019-06-13 NOTE — PROGRESS NOTES
Samir Edge is a 22 y.o. female, [id number], Patient's last menstrual period was 2018., Estimated Date of Delivery: 6/15/19, 39w4d. Called to perform AROM on this patient. History reviewed and there is no significant change except: patient had epidural.   GBS is neg, PenicillinG [de-identified]N/A  Pitocin: None, Contractions are every 5 minutes, FH: 140 with accelerations and moderate variability. H/o recent HSV/ has refuse     Patient is aware of risk of infection to the baby with vaginal delivery and ROM. VAGINAL EXAM:    Cervical dilatation: 5 cm, Effacement: 75%, Presentation: Vx, Station: -3/-2. AROM performed. Amniotic Fluid: Clear.

## 2019-06-14 LAB
HCT VFR BLD CALC: 28.2 % (ref 34–48)
HEMOGLOBIN: 9 G/DL (ref 11.5–15.5)

## 2019-06-14 PROCEDURE — 6370000000 HC RX 637 (ALT 250 FOR IP): Performed by: OBSTETRICS & GYNECOLOGY

## 2019-06-14 PROCEDURE — 36415 COLL VENOUS BLD VENIPUNCTURE: CPT

## 2019-06-14 PROCEDURE — 85018 HEMOGLOBIN: CPT

## 2019-06-14 PROCEDURE — 1220000000 HC SEMI PRIVATE OB R&B

## 2019-06-14 PROCEDURE — 85014 HEMATOCRIT: CPT

## 2019-06-14 RX ADMIN — HYDROCODONE BITARTRATE AND ACETAMINOPHEN 1 TABLET: 5; 325 TABLET ORAL at 15:02

## 2019-06-14 RX ADMIN — IBUPROFEN 800 MG: 800 TABLET, FILM COATED ORAL at 18:55

## 2019-06-14 RX ADMIN — HYDROCODONE BITARTRATE AND ACETAMINOPHEN 1 TABLET: 5; 325 TABLET ORAL at 18:54

## 2019-06-14 RX ADMIN — IBUPROFEN 800 MG: 800 TABLET, FILM COATED ORAL at 06:59

## 2019-06-14 RX ADMIN — ACETAMINOPHEN 650 MG: 325 TABLET ORAL at 09:13

## 2019-06-14 RX ADMIN — DOCUSATE SODIUM 100 MG: 100 CAPSULE, LIQUID FILLED ORAL at 09:13

## 2019-06-14 RX ADMIN — DOCUSATE SODIUM 100 MG: 100 CAPSULE, LIQUID FILLED ORAL at 19:38

## 2019-06-14 RX ADMIN — FERROUS SULFATE TAB 325 MG (65 MG ELEMENTAL FE) 325 MG: 325 (65 FE) TAB at 18:53

## 2019-06-14 ASSESSMENT — PAIN SCALES - GENERAL
PAINLEVEL_OUTOF10: 5
PAINLEVEL_OUTOF10: 5
PAINLEVEL_OUTOF10: 3
PAINLEVEL_OUTOF10: 3
PAINLEVEL_OUTOF10: 1
PAINLEVEL_OUTOF10: 1

## 2019-06-14 NOTE — PROGRESS NOTES
SUBJECTIVE:   Patient without complaint    OBJECTIVE:   /66   Pulse 67   Temp 97.5 °F (36.4 °C) (Oral)   Resp 16   Ht 5' 2\" (1.575 m)   Wt 185 lb (83.9 kg)   LMP 09/07/2018   SpO2 100%   Breastfeeding?  Unknown   BMI 33.84 kg/m²      Lab Results   Component Value Date    WBC 9.1 06/11/2019    HGB 9.0 (L) 06/14/2019    HCT 28.2 (L) 06/14/2019    MCV 85.5 06/11/2019     06/11/2019         Lochia normal rubra   Uterus firm, nontender    ASSESSMENT/PLAN:   Postpartum Day #1   Advance care   Home tomorrow   Acute blood loss anemia - FeSO4    Duran Duval 6/14/2019 2:19 PM
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
## 2019-06-14 NOTE — CARE COORDINATION
SW Discharge Planning     SW called [facility] ( [phone]) and spoke with , Pratima Coyle who reported that the case was screened out, and that baby can be discharged home.      PLAN    Per [facility] ( [phone]) case was not opened and baby can be discharged home     Electronically signed by Ayden Cheng on 6/14/2019 at 2:20 PM

## 2019-06-14 NOTE — PLAN OF CARE
Problem: Fluid Volume - Imbalance:  Goal: Absence of postpartum hemorrhage signs and symptoms  Description  Absence of postpartum hemorrhage signs and symptoms  Outcome: Met This Shift     Problem: Mood - Altered:  Goal: Mood stable  Description  Mood stable  Outcome: Met This Shift     Problem: Anxiety:  Goal: Level of anxiety will decrease  Description  Level of anxiety will decrease  Outcome: Completed     Problem: Breathing Pattern - Ineffective:  Goal: Able to breathe comfortably  Description  Able to breathe comfortably  Outcome: Completed     Problem: Fluid Volume - Imbalance:  Goal: Absence of imbalanced fluid volume signs and symptoms  Description  Absence of imbalanced fluid volume signs and symptoms  Outcome: Completed  Goal: Absence of intrapartum hemorrhage signs and symptoms  Description  Absence of intrapartum hemorrhage signs and symptoms  Outcome: Completed     Problem: Infection - Intrapartum Infection:  Goal: Will show no infection signs and symptoms  Description  Will show no infection signs and symptoms  Outcome: Completed     Problem:  Screening:  Goal: Ability to make informed decisions regarding treatment has improved  Description  Ability to make informed decisions regarding treatment has improved  Outcome: Completed     Problem: Tissue Perfusion - Uteroplacental, Altered:  Goal: Absence of abnormal fetal heart rate pattern  Description  Absence of abnormal fetal heart rate pattern  Outcome: Completed     Problem: Urinary Retention:  Goal: Experiences of bladder distention will decrease  Description  Experiences of bladder distention will decrease  Outcome: Completed  Goal: Urinary elimination within specified parameters  Description  Urinary elimination within specified parameters  Outcome: Completed     Problem: Infection - Risk of, Puerperal Infection:  Goal: Will show no infection signs and symptoms  Description  Will show no infection signs and symptoms  Outcome: Completed

## 2019-06-15 VITALS
BODY MASS INDEX: 34.04 KG/M2 | SYSTOLIC BLOOD PRESSURE: 115 MMHG | HEART RATE: 69 BPM | WEIGHT: 185 LBS | TEMPERATURE: 98.3 F | DIASTOLIC BLOOD PRESSURE: 83 MMHG | RESPIRATION RATE: 16 BRPM | OXYGEN SATURATION: 100 % | HEIGHT: 62 IN

## 2019-06-15 PROCEDURE — 6370000000 HC RX 637 (ALT 250 FOR IP): Performed by: OBSTETRICS & GYNECOLOGY

## 2019-06-15 RX ADMIN — DOCUSATE SODIUM 100 MG: 100 CAPSULE, LIQUID FILLED ORAL at 07:52

## 2019-06-15 RX ADMIN — IBUPROFEN 800 MG: 800 TABLET, FILM COATED ORAL at 07:52

## 2019-06-15 RX ADMIN — FERROUS SULFATE TAB 325 MG (65 MG ELEMENTAL FE) 325 MG: 325 (65 FE) TAB at 07:52

## 2019-06-15 RX ADMIN — HYDROCODONE BITARTRATE AND ACETAMINOPHEN 1 TABLET: 5; 325 TABLET ORAL at 01:13

## 2019-06-15 RX ADMIN — IBUPROFEN 800 MG: 800 TABLET, FILM COATED ORAL at 16:08

## 2019-06-15 ASSESSMENT — PAIN SCALES - GENERAL
PAINLEVEL_OUTOF10: 5
PAINLEVEL_OUTOF10: 6
PAINLEVEL_OUTOF10: 0
PAINLEVEL_OUTOF10: 5

## 2019-06-15 NOTE — PROGRESS NOTES
Progress Note    SUBJECTIVE:  Pt comfortable  + void   +flatus  decreased vaginal bleeding  + Breastfeeding    OBJECTIVE:    VITALS:  BP (!) 110/54   Pulse 64   Temp 98 °F (36.7 °C) (Oral)   Resp 16   Ht 5' 2\" (1.575 m)   Wt 185 lb (83.9 kg)   LMP 2018   SpO2 100%   Breastfeeding?  Unknown   BMI 33.84 kg/m²   Physical Exam  Uterus firm,nt  EXT nt    DATA:  Hemoglobin/Hematocrit:    Lab Results   Component Value Date    HGB 9.0 2019    HCT 28.2 2019       ASSESSMENT AND PLAN:    23yo  @ 39+ weeks s/p   Routine post partum care  Discharge home today       Prem Randhawa  6/15/2019NOW@

## 2019-06-15 NOTE — PLAN OF CARE
Problem: Mood - Altered:  Goal: Mood stable  Description  Mood stable  6/15/2019 0204 by Bryce Lyons RN  Outcome: Met This Shift  6/15/2019 0157 by Bryce Lyons RN  Outcome: Met This Shift

## 2019-06-16 NOTE — DISCHARGE SUMMARY
Obstetric Discharge Summary    Admitting Diagnosis  IUP 39 weeks  OB History      Para Term  AB Living    3 2 2     2    SAB TAB Ectopic Molar Multiple Live Births              2          Reasons for Admission on 2018 12:21 AM  Hyperemesis [R11.10]  Hyperemesis [R11.10]  Hyperemesis [R11.10]  No comment available  Induction of Labor    Prenatal Procedures  None    Intrapartum Procedures                 Spontaneous Vaginal Delivery:        Postpartum Procedures  None    Postpartum/Operative Complications       [place] Data  This patient has no babies on file. Discharge With Mother  Complications: No    Discharge Diagnosis       Discharge Information  Discharge Medication List as of 2018  9:55 AM      CONTINUE these medications which have CHANGED    Details   ondansetron (ZOFRAN ODT) 4 MG disintegrating tablet Take 1 tablet by mouth every 8 hours as needed for Nausea or Vomiting, Disp-30 tablet, R-0Normal         CONTINUE these medications which have NOT CHANGED    Details   multivitamin (ANIMAL SHAPES) with C & FA CHEW chewable tablet Take 1 tablet by mouth dailyHistorical Med         STOP taking these medications       Ifbqry-RuPxs-RhTlf-Meth-FA-DHA (PRENATE MINI) 18-0.6-0.4-350 MG CAPS Comments:   Reason for Stopping:             Condition at Discharge:    Stable    No discharge procedures on file. Discharge to: Home  Follow up in 6 weeks at office.

## 2020-02-19 ENCOUNTER — TELEPHONE (OUTPATIENT)
Dept: PRIMARY CARE CLINIC | Age: 25
End: 2020-02-19

## 2020-02-19 ENCOUNTER — HOSPITAL ENCOUNTER (OUTPATIENT)
Age: 25
Discharge: HOME | End: 2020-02-21
Payer: MEDICAID

## 2020-02-19 ENCOUNTER — OFFICE VISIT (OUTPATIENT)
Dept: FAMILY MEDICINE CLINIC | Age: 25
End: 2020-02-19
Payer: MEDICAID

## 2020-02-19 VITALS
HEART RATE: 84 BPM | TEMPERATURE: 98.8 F | OXYGEN SATURATION: 98 % | RESPIRATION RATE: 18 BRPM | BODY MASS INDEX: 31.08 KG/M2 | HEIGHT: 67 IN | WEIGHT: 198 LBS

## 2020-02-19 PROCEDURE — 1036F TOBACCO NON-USER: CPT | Performed by: PHYSICIAN ASSISTANT

## 2020-02-19 PROCEDURE — 87491 CHLMYD TRACH DNA AMP PROBE: CPT

## 2020-02-19 PROCEDURE — G8417 CALC BMI ABV UP PARAM F/U: HCPCS | Performed by: PHYSICIAN ASSISTANT

## 2020-02-19 PROCEDURE — 99213 OFFICE O/P EST LOW 20 MIN: CPT | Performed by: PHYSICIAN ASSISTANT

## 2020-02-19 PROCEDURE — G8484 FLU IMMUNIZE NO ADMIN: HCPCS | Performed by: PHYSICIAN ASSISTANT

## 2020-02-19 PROCEDURE — G8427 DOCREV CUR MEDS BY ELIG CLIN: HCPCS | Performed by: PHYSICIAN ASSISTANT

## 2020-02-19 PROCEDURE — 87210 SMEAR WET MOUNT SALINE/INK: CPT

## 2020-02-19 PROCEDURE — 87591 N.GONORRHOEAE DNA AMP PROB: CPT

## 2020-02-19 PROCEDURE — 87529 HSV DNA AMP PROBE: CPT

## 2020-02-19 RX ORDER — VALACYCLOVIR HCL 1000 MG
1000 TABLET ORAL 3 TIMES DAILY
Qty: 21 TABLET | Refills: 0
Start: 2020-02-19 | End: 2020-02-20 | Stop reason: SDUPTHER
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
## 2020-02-20 ENCOUNTER — TELEPHONE (OUTPATIENT)
Dept: FAMILY MEDICINE CLINIC | Age: 25
End: 2020-02-20

## 2020-02-20 LAB
HSV 1 BY PCR: ABNORMAL
HSV 2 BY PCR: ABNORMAL
ORGANISM: ABNORMAL

## 2020-02-20 RX ORDER — VALACYCLOVIR HCL 1000 MG
1000 TABLET ORAL 3 TIMES DAILY
Qty: 21 TABLET | Refills: 0 | Status: SHIPPED | OUTPATIENT
Start: 2020-02-20 | End: 2020-02-27

## 2020-02-20 NOTE — RESULT ENCOUNTER NOTE
Few things -     1)  Pt's herpes screen was positive, she should discuss her frequent outbreaks with her PCP/GYN as they may want to put her on a preventative dose of the valtrex (or other anti viral medications). 2)  There was a problem with the valtrex I sent over yesterday. I resent it today. I apologize for that. 3)  We are still waiting for the other STD testing.     Thanks, Kvng Eugene

## 2020-02-20 NOTE — TELEPHONE ENCOUNTER
Pt seen through [facility] yesterday by Javier evans. Pt called in today and states pharmacy did not receive rx. I see in pts chart that Javier Burrell attempted to send in Valtrex 1g t.i.d x 7days. Since Javier Burrell is not in office today, I called the prescription in to a pharmacist at [facility].

## 2020-02-24 LAB
C TRACH DNA GENITAL QL NAA+PROBE: NEGATIVE
CULTURE, TRICHOMONAS: NORMAL
N. GONORRHOEAE DNA: NEGATIVE
SOURCE: NORMAL

## 2020-03-24 ENCOUNTER — OFFICE VISIT (OUTPATIENT)
Dept: FAMILY MEDICINE CLINIC | Age: 25
End: 2020-03-24
Payer: MEDICAID

## 2020-03-24 VITALS
WEIGHT: 196 LBS | TEMPERATURE: 98 F | DIASTOLIC BLOOD PRESSURE: 76 MMHG | OXYGEN SATURATION: 98 % | BODY MASS INDEX: 29.7 KG/M2 | SYSTOLIC BLOOD PRESSURE: 126 MMHG | HEIGHT: 68 IN | HEART RATE: 97 BPM

## 2020-03-24 PROCEDURE — G8484 FLU IMMUNIZE NO ADMIN: HCPCS | Performed by: PHYSICIAN ASSISTANT

## 2020-03-24 PROCEDURE — G8417 CALC BMI ABV UP PARAM F/U: HCPCS | Performed by: PHYSICIAN ASSISTANT

## 2020-03-24 PROCEDURE — G8427 DOCREV CUR MEDS BY ELIG CLIN: HCPCS | Performed by: PHYSICIAN ASSISTANT

## 2020-03-24 PROCEDURE — 1036F TOBACCO NON-USER: CPT | Performed by: PHYSICIAN ASSISTANT

## 2020-03-24 PROCEDURE — 99213 OFFICE O/P EST LOW 20 MIN: CPT | Performed by: PHYSICIAN ASSISTANT

## 2020-03-24 RX ORDER — CLOTRIMAZOLE 1 %
CREAM (GRAM) TOPICAL
Qty: 1 TUBE | Refills: 0 | Status: SHIPPED | OUTPATIENT
Start: 2020-03-24 | End: 2020-03-31

## 2020-03-24 ASSESSMENT — ENCOUNTER SYMPTOMS
BACK PAIN: 0
DIARRHEA: 0
NAUSEA: 0
VOMITING: 0
ABDOMINAL PAIN: 0
SHORTNESS OF BREATH: 0
SORE THROAT: 0
PHOTOPHOBIA: 0
COUGH: 0

## 2020-03-24 NOTE — PROGRESS NOTES
3/24/20  Connor Sarah : 1995 Sex: female  Age 26 y.o. Subjective:  Chief Complaint   Patient presents with    Other     ringworm or dry skin         80-year-old female with a history of diabetes and anxiety presents to the walk-in clinic for evaluation of a small rash to her right inner thigh. She states that she noticed it about 10 days ago. She describes it as itching at that time. She denies any redness or warmth. Patient states that she has been using some topical cream for eczema. Patient states that she is not sure that she has seen an improvement. She denies any known sick contacts. She denies anybody similar in the house with the same rash. She denies any new soaps, lotions, detergents or medications. No fever chills. No nausea or vomiting. No chance of pregnancy. Review of Systems   Constitutional: Negative for chills and fever. HENT: Negative for congestion, ear pain and sore throat. Eyes: Negative for photophobia and visual disturbance. Respiratory: Negative for cough and shortness of breath. Cardiovascular: Negative for chest pain. Gastrointestinal: Negative for abdominal pain, diarrhea, nausea and vomiting. Genitourinary: Negative for difficulty urinating, dysuria, frequency and urgency. Musculoskeletal: Negative for back pain, neck pain and neck stiffness. Skin: Positive for rash. Neurological: Negative for dizziness, syncope, weakness, light-headedness and headaches. Hematological: Negative for adenopathy. Does not bruise/bleed easily. Psychiatric/Behavioral: Negative for agitation and confusion. All other systems reviewed and are negative. PMH:     Past Medical History:   Diagnosis Date    Diabetes mellitus ([facility] [address]     Herpes 2019    Mental disorder     anxiety    Rh incompatibility        Past Surgical History:   Procedure Laterality Date    WISDOM TOOTH EXTRACTION         History reviewed.  No pertinent family

## 2020-10-21 ENCOUNTER — OFFICE VISIT (OUTPATIENT)
Dept: FAMILY MEDICINE CLINIC | Age: 25
End: 2020-10-21
Payer: MEDICAID

## 2020-10-21 ENCOUNTER — HOSPITAL ENCOUNTER (OUTPATIENT)
Age: 25
Discharge: HOME | End: 2020-10-23
Payer: MEDICAID

## 2020-10-21 VITALS
DIASTOLIC BLOOD PRESSURE: 80 MMHG | SYSTOLIC BLOOD PRESSURE: 115 MMHG | OXYGEN SATURATION: 98 % | BODY MASS INDEX: 28.89 KG/M2 | HEART RATE: 85 BPM | WEIGHT: 190 LBS | TEMPERATURE: 98.2 F

## 2020-10-21 LAB — S PYO AG THROAT QL: NORMAL

## 2020-10-21 PROCEDURE — G8427 DOCREV CUR MEDS BY ELIG CLIN: HCPCS | Performed by: PHYSICIAN ASSISTANT

## 2020-10-21 PROCEDURE — G8417 CALC BMI ABV UP PARAM F/U: HCPCS | Performed by: PHYSICIAN ASSISTANT

## 2020-10-21 PROCEDURE — 1036F TOBACCO NON-USER: CPT | Performed by: PHYSICIAN ASSISTANT

## 2020-10-21 PROCEDURE — 99213 OFFICE O/P EST LOW 20 MIN: CPT | Performed by: PHYSICIAN ASSISTANT

## 2020-10-21 PROCEDURE — 87880 STREP A ASSAY W/OPTIC: CPT | Performed by: PHYSICIAN ASSISTANT

## 2020-10-21 PROCEDURE — G8484 FLU IMMUNIZE NO ADMIN: HCPCS | Performed by: PHYSICIAN ASSISTANT

## 2020-10-21 PROCEDURE — 87070 CULTURE OTHR SPECIMN AEROBIC: CPT

## 2020-10-21 RX ORDER — BROMPHENIRAMINE MALEATE, PSEUDOEPHEDRINE HYDROCHLORIDE, AND DEXTROMETHORPHAN HYDROBROMIDE 2; 30; 10 MG/5ML; MG/5ML; MG/5ML
5 SYRUP ORAL 4 TIMES DAILY PRN
Qty: 120 ML | Refills: 0 | Status: SHIPPED
Start: 2020-10-21 | End: 2020-10-21

## 2020-10-21 RX ORDER — LORATADINE AND PSEUDOEPHEDRINE SULFATE 5; 120 MG/1; MG/1
1 TABLET, EXTENDED RELEASE ORAL 2 TIMES DAILY
Qty: 20 TABLET | Refills: 0 | Status: SHIPPED | OUTPATIENT
Start: 2020-10-21 | End: 2020-10-31

## 2020-10-21 RX ORDER — BENZONATATE 200 MG/1
200 CAPSULE ORAL 3 TIMES DAILY PRN
Qty: 30 CAPSULE | Refills: 0 | Status: SHIPPED | OUTPATIENT
Start: 2020-10-21 | End: 2020-10-28

## 2020-10-21 NOTE — PROGRESS NOTES
10/21/2020   [address]  [facility] [address]  [phone]    Cheyenne Shaffer  : 1995  Age: 26 y.o. Sex: female      Subjective:  Chief Complaint   Patient presents with    Pharyngitis    Drainage       HPI: The patient states sore throat and congestion that started  Yesterday, had slight cough this morning, pt thinks it is 2nd to drainage. 1 bout of diarrhea this morning. Pt took tylenol last pm.  Denies fever, chills. Denies chest pain or shortness of breath. No vomiting. Eating and drinking without difficulty. Denies loss of taste loss of smell. Denies rashes. Denies headache or body aches. The patient presents for evaluation. ROS:    Constitutional: Negative for fatigue, fever and unexpected weight change. HENT: + for sinus pressure, drainage, congestion, sore throat. Negative for  ear discharge, ear pain, hearing loss, mouth sores, sneezing and sore throat. Eyes: Negative for photophobia, pain, redness and itching. Respiratory: +cough,  Negative for chest tightness, shortness of breath and wheezing. Cardiovascular: Negative for chest pain, palpitations and leg swelling. Gastrointestinal:  +diarrhea; Negative for abdominal pain, constipation,  nausea and vomiting. Endocrine: Negative for cold intolerance and heat intolerance. Genitourinary: Negative for difficulty urinating, dysuria, frequency, hematuria and urgency. Musculoskeletal: Negative for arthralgias, back pain, joint swelling, myalgias, neck pain and neck stiffness. Skin: Negative for color change, pallor and wound. Allergic/Immunologic: Negative for environmental allergies and food allergies. Neurological: Negative for dizziness, seizures, syncope, weakness, light-headedness and headaches. Hematological: Negative for adenopathy.        Current Outpatient Medications:     loratadine-pseudoephedrine (CLARITIN-D 12 HOUR) 5-120 MG per extended release tablet, Take 1 tablet by mouth 2 times daily for 10 days, Disp: 20 tablet, Rfl: 0    benzonatate (TESSALON) 200 MG capsule, Take 1 capsule by mouth 3 times daily as needed for Cough, Disp: 30 capsule, Rfl: 0   No Known Allergies     Objective:  Vitals:    10/21/20 0835   BP: 115/80   Pulse: 85   Temp: 98.2 °F (36.8 °C)   TempSrc: Temporal   SpO2: 98%   Weight: 190 lb (86.2 kg)        Exam:  Const: Appears healthy and well developed. No signs of acute distress present. Vitals reviewed per triage. Head/Face: Normocephalic, atraumatic. Facies is symmetric. Eyes: PERRL. ENMT: Tympanic membranes are pearly gray with good light reflex bilaterally. Nares are patent. Buccal mucosa is moist.  No erythema in the posterior pharynx. Neck: Supple and symmetric. Palpation reveals no adenopathy. Trachea midline. Resp: Lungs are clear bilaterally. No adventitious sounds audible. CV: S1 is normal. S2 is normal.Pulses are equal bilaterally. Abdomen:  Soft and non tender, non distended. Musculo: Patient moves extremities without pain or limitation. No pedal edema. Skin: Skin is warm and dry. Neuro: Alert and oriented x3. Speech is articulate and fluent. Psych: Patient's mood and affect is appropriate to situation. Juan Hernandes was seen today for pharyngitis and drainage. Diagnoses and all orders for this visit:    Pain in throat  -     POCT rapid strep A  -     Culture, Throat; Future    Upper respiratory tract infection, unspecified type  -     Discontinue: brompheniramine-pseudoephedrine-DM 2-30-10 MG/5ML syrup; Take 5 mLs by mouth 4 times daily as needed for Congestion or Cough  -     loratadine-pseudoephedrine (CLARITIN-D 12 HOUR) 5-120 MG per extended release tablet; Take 1 tablet by mouth 2 times daily for 10 days  -     benzonatate (TESSALON) 200 MG capsule; Take 1 capsule by mouth 3 times daily as needed for Cough        Follow-up with your PCP in 5-7  days if no improvement.  To the ED if any new or worsening symptoms    Seen By:     Bethanie rAcos, PAULO

## 2020-10-24 LAB — THROAT CULTURE: NORMAL

## 2021-07-10 ENCOUNTER — HOSPITAL ENCOUNTER (EMERGENCY)
Age: 26
Discharge: HOME | End: 2021-07-10
Attending: EMERGENCY MEDICINE
Payer: MEDICAID

## 2021-07-10 VITALS
TEMPERATURE: 97.6 F | SYSTOLIC BLOOD PRESSURE: 132 MMHG | OXYGEN SATURATION: 98 % | RESPIRATION RATE: 18 BRPM | HEART RATE: 88 BPM | HEIGHT: 68 IN | BODY MASS INDEX: 27.74 KG/M2 | DIASTOLIC BLOOD PRESSURE: 90 MMHG | WEIGHT: 183 LBS

## 2021-07-10 DIAGNOSIS — O03.9 MISCARRIAGE: Primary | ICD-10-CM

## 2021-07-10 LAB
ABO/RH: NORMAL
ALBUMIN SERPL-MCNC: 4.8 G/DL (ref 3.5–5.2)
ALP BLD-CCNC: 89 U/L (ref 35–104)
ALT SERPL-CCNC: 27 U/L (ref 0–32)
ANION GAP SERPL CALCULATED.3IONS-SCNC: 11 MMOL/L (ref 7–16)
ANTIBODY SCREEN: NORMAL
AST SERPL-CCNC: 23 U/L (ref 0–31)
BACTERIA: ABNORMAL /HPF
BASOPHILS ABSOLUTE: 0.03 E9/L (ref 0–0.2)
BASOPHILS RELATIVE PERCENT: 0.3 % (ref 0–2)
BILIRUB SERPL-MCNC: 0.3 MG/DL (ref 0–1.2)
BILIRUBIN URINE: NEGATIVE
BLOOD, URINE: ABNORMAL
BUN BLDV-MCNC: 11 MG/DL (ref 6–20)
CALCIUM SERPL-MCNC: 9.8 MG/DL (ref 8.6–10.2)
CHLORIDE BLD-SCNC: 104 MMOL/L (ref 98–107)
CLARITY: ABNORMAL
CO2: 28 MMOL/L (ref 22–29)
COLOR: ABNORMAL
CREAT SERPL-MCNC: 0.7 MG/DL (ref 0.5–1)
EOSINOPHILS ABSOLUTE: 0.04 E9/L (ref 0.05–0.5)
EOSINOPHILS RELATIVE PERCENT: 0.4 % (ref 0–6)
GFR AFRICAN AMERICAN: >60
GFR NON-AFRICAN AMERICAN: >60 ML/MIN/1.73
GLUCOSE BLD-MCNC: 83 MG/DL (ref 74–99)
GLUCOSE URINE: NEGATIVE MG/DL
GONADOTROPIN, CHORIONIC (HCG) QUANT: <0.1 MIU/ML
HCT VFR BLD CALC: 42.9 % (ref 34–48)
HEMOGLOBIN: 14 G/DL (ref 11.5–15.5)
IMMATURE GRANULOCYTES #: 0.02 E9/L
IMMATURE GRANULOCYTES %: 0.2 % (ref 0–5)
KETONES, URINE: ABNORMAL MG/DL
LEUKOCYTE ESTERASE, URINE: ABNORMAL
LYMPHOCYTES ABSOLUTE: 1.43 E9/L (ref 1.5–4)
LYMPHOCYTES RELATIVE PERCENT: 14.7 % (ref 20–42)
MCH RBC QN AUTO: 28.9 PG (ref 26–35)
MCHC RBC AUTO-ENTMCNC: 32.6 % (ref 32–34.5)
MCV RBC AUTO: 88.6 FL (ref 80–99.9)
MONOCYTES ABSOLUTE: 0.67 E9/L (ref 0.1–0.95)
MONOCYTES RELATIVE PERCENT: 6.9 % (ref 2–12)
NEUTROPHILS ABSOLUTE: 7.55 E9/L (ref 1.8–7.3)
NEUTROPHILS RELATIVE PERCENT: 77.5 % (ref 43–80)
NITRITE, URINE: POSITIVE
PDW BLD-RTO: 13.2 FL (ref 11.5–15)
PH UA: 6.5 (ref 5–9)
PLATELET # BLD: 268 E9/L (ref 130–450)
PMV BLD AUTO: 10.2 FL (ref 7–12)
POTASSIUM REFLEX MAGNESIUM: 3.9 MMOL/L (ref 3.5–5)
PROTEIN UA: >=300 MG/DL
RBC # BLD: 4.84 E12/L (ref 3.5–5.5)
RBC UA: >20 /HPF (ref 0–2)
RHIG LOT NUMBER: NORMAL
SODIUM BLD-SCNC: 143 MMOL/L (ref 132–146)
SPECIFIC GRAVITY UA: 1.02 (ref 1–1.03)
TOTAL PROTEIN: 8.1 G/DL (ref 6.4–8.3)
UROBILINOGEN, URINE: 2 E.U./DL
WBC # BLD: 9.7 E9/L (ref 4.5–11.5)
WBC UA: ABNORMAL /HPF (ref 0–5)

## 2021-07-10 PROCEDURE — 86900 BLOOD TYPING SEROLOGIC ABO: CPT

## 2021-07-10 PROCEDURE — 99284 EMERGENCY DEPT VISIT MOD MDM: CPT

## 2021-07-10 PROCEDURE — 96372 THER/PROPH/DIAG INJ SC/IM: CPT

## 2021-07-10 PROCEDURE — 81001 URINALYSIS AUTO W/SCOPE: CPT

## 2021-07-10 PROCEDURE — 6360000002 HC RX W HCPCS: Performed by: EMERGENCY MEDICINE

## 2021-07-10 PROCEDURE — 84702 CHORIONIC GONADOTROPIN TEST: CPT

## 2021-07-10 PROCEDURE — 86850 RBC ANTIBODY SCREEN: CPT

## 2021-07-10 PROCEDURE — 80053 COMPREHEN METABOLIC PANEL: CPT

## 2021-07-10 PROCEDURE — 85025 COMPLETE CBC W/AUTO DIFF WBC: CPT

## 2021-07-10 PROCEDURE — 86901 BLOOD TYPING SEROLOGIC RH(D): CPT

## 2021-07-10 PROCEDURE — 36415 COLL VENOUS BLD VENIPUNCTURE: CPT

## 2021-07-10 RX ADMIN — HUMAN RHO(D) IMMUNE GLOBULIN 300 MCG: 300 INJECTION, SOLUTION INTRAMUSCULAR at 18:29

## 2021-07-10 NOTE — ED PROVIDER NOTES
HPI:  7/10/21, Time: 6:09 PM EDT         Real Maldonado is a 23 y.o. female presenting to the ED for general bleeding 7.5 weeks pregnant, beginning 4 hours ago. The complaint has been persistent, moderate in severity, and worsened by nothing. Patient is a  presenting due to diffuse vaginal bleeding with clots that started this morning. She is O- gets RhoGam with each pregnancy. She states this started spontaneous this morning with lower pelvic cramps. . Patient denies fever/chills, sore throat, cough, congestion, chest pain, shortness of breath, edema, headache, visual disturbances, focal paresthesias, focal weakness, abdominal pain, nausea, vomiting, diarrhea, constipation, dysuria, hematuria, trauma, neck or back pain, rash or other complaints. ROS:   A complete review of systems was performed and all pertinent positives and negatives are stated within HPI, all other systems reviewed and are negative.      --------------------------------------------- PAST HISTORY ---------------------------------------------  Past Medical History:  has a past medical history of Diabetes mellitus ([facility] [address] Herpes, Mental disorder, and Rh incompatibility. Past Surgical History:  has a past surgical history that includes Fisher tooth extraction. Social History:  reports that she has never smoked. She has never used smokeless tobacco. She reports that she does not drink alcohol and does not use drugs. Family History: family history is not on file. The patients home medications have been reviewed. Allergies: Patient has no known allergies. ----------------------------------------PHYSICAL EXAM--------------------------------------  Constitutional:  Well developed, well nourished, no acute distress, non-toxic appearance   Eyes:  PERRL, conjunctiva normal, EOMI  HENT:  Atraumatic, external ears normal, nose normal, oropharynx moist, no pharyngeal exudates.  Neck- normal range of motion, no (83 kg)   SpO2 98%   BMI 27.83 kg/m²   Oxygen Saturation Interpretation: Normal      The patients available past medical records and past encounters were reviewed. ------------------------------ ED COURSE/MEDICAL DECISION MAKING----------------------  Medications   rho(D) immune globulin (HYPERRHO S/D) injection 300 mcg (300 mcg Intramuscular Given 7/10/21 1829)            Medical Decision Making:    Patient is a 71-year-old female  who presented with vaginal bleeding. She is known O-, patient evaluated for bleeding during pregnancy. Hcg was tested and was found to be less than 0.1. Likely patient had miscarried previous to arrival to emergency department. She was having typical pains associated with her. And was having passing of bright red blood and clots. In the emergency department patient was given RhoGam and evaluated for this spontaneous . On evaluation administration of RhoGam patient was discharged with follow-up to her OB/GYN for further evaluation treatment. Patient was explicitly instructed on specific signs and symptoms on which to return to the emergency room for. Patient was instructed to return to the ER for any new or worsening symptoms. Additional discharge instructions were given verbally. All questions were answered. Patient is comfortable and agreeable with discharge plan. Patient in no acute distress and non-toxic in appearance. This patient's ED course included: a personal history and physicial examination, re-evaluation prior to disposition, multiple bedside re-evaluations, IV medications, cardiac monitoring, continuous pulse oximetry, complex medical decision making and emergency management and a personal history and physicial eaxmination    This patient has remained hemodynamically stable during their ED course. Re-Evaluations:  Time:    Re-evaluation.   Patients symptoms show no change  Repeat physical examination is not changed        Counseling: The emergency provider has spoken with the patient and discussed todays results, in addition to providing specific details for the plan of care and counseling regarding the diagnosis and prognosis. Questions are answered at this time and they are agreeable with the plan.    --------------------------- IMPRESSION AND DISPOSITION ---------------------------------    IMPRESSION  1.  Miscarriage        DISPOSITION  Disposition: Discharge to home  Patient condition is stable              Rajesh Hsieh DO  Resident  07/11/21 0676

## 2022-03-20 ENCOUNTER — HOSPITAL ENCOUNTER (OUTPATIENT)
Age: 27
Discharge: HOME | End: 2022-03-20
Attending: OBSTETRICS & GYNECOLOGY | Admitting: OBSTETRICS & GYNECOLOGY
Payer: MEDICAID

## 2022-03-20 VITALS
TEMPERATURE: 98.3 F | SYSTOLIC BLOOD PRESSURE: 116 MMHG | RESPIRATION RATE: 18 BRPM | DIASTOLIC BLOOD PRESSURE: 64 MMHG | HEART RATE: 93 BPM

## 2022-03-20 PROBLEM — O46.92 VAGINAL BLEEDING IN PREGNANCY, SECOND TRIMESTER: Status: ACTIVE | Noted: 2022-03-20

## 2022-03-20 LAB
BACTERIA: NORMAL /HPF
BILIRUBIN URINE: NEGATIVE
BLOOD, URINE: ABNORMAL
CLARITY: CLEAR
COLOR: YELLOW
EPITHELIAL CELLS, UA: NORMAL /HPF
GLUCOSE URINE: NEGATIVE MG/DL
KETONES, URINE: NEGATIVE MG/DL
LEUKOCYTE ESTERASE, URINE: NEGATIVE
NITRITE, URINE: NEGATIVE
PH UA: 6 (ref 5–9)
PROTEIN UA: NEGATIVE MG/DL
RBC UA: NORMAL /HPF (ref 0–2)
SPECIFIC GRAVITY UA: 1.02 (ref 1–1.03)
UROBILINOGEN, URINE: 0.2 E.U./DL
WBC UA: NORMAL /HPF (ref 0–5)

## 2022-03-20 PROCEDURE — 99213 OFFICE O/P EST LOW 20 MIN: CPT | Performed by: NURSE PRACTITIONER

## 2022-03-20 PROCEDURE — 99211 OFF/OP EST MAY X REQ PHY/QHP: CPT

## 2022-03-20 PROCEDURE — 81001 URINALYSIS AUTO W/SCOPE: CPT

## 2022-03-20 NOTE — PROGRESS NOTES
[id number]  27w3d  Patient presents to L&D from home. Patient c/o vaginal bleeding, states she after she urinated she wiped and there was light pink blood with a small clot. States this happened one other time about two weeks ago. Patient denies LOF. Patient also c/o upper abdominal cramping.  +FM

## 2022-03-20 NOTE — PROGRESS NOTES
Department of Obstetrics and Gynecology  Labor and Delivery  Attending Triage Note      SUBJECTIVE:  Ramiro Banuelos is a 33 y.o. female, [id number], No LMP recorded. Patient is pregnant. ,Estimated Date of Delivery: 6/16/22, 27w3d, with the complaint of light vaginal spotting with wiping. Pt denies cramping or urinary sx. +fm, denies lof. Prenatal course: uneventful    MEDICAL HISTORY:      Diagnosis Date    Diabetes mellitus ([address]     Herpes 6/4/2019    Mental disorder     anxiety    Rh incompatibility        SURGICAL HISTORY:      Procedure Laterality Date    WISDOM TOOTH EXTRACTION         FAMILY HISTORY  History reviewed. No pertinent family history. Medication prior to Admission:  Medications Prior to Admission: Prenatal Vit-Fe Fumarate-FA (PRENATAL 1+1 PO), Take by mouth    ALLERGIES:  Patient has no known allergies.       Review of Systems:   Ears, nose, mouth, throat, and face: negative  Respiratory: negative  Cardiovascular: negative  Gastrointestinal: negative  Genitourinary:negative  Integument/breast: negative  Hematologic/lymphatic: negative  Musculoskeletal:negative  Neurological: negative  Behavioral/Psych: negative  Endocrine: negative  Allergic/Immunologic: negative    OBJECTIVE    Vitals:  /64   Pulse 93   Temp 98.3 °F (36.8 °C) (Oral)   Resp 18       NECK:  Supple, symmetrical, trachea midline, no adenopathy, thyroid symmetric, not enlarged and no tenderness, skin normal  LUNGS:  No increased work of breathing, good air exchange, clear to auscultation bilaterally, no crackles or wheezing  CARDIOVASCULAR:  normal S1 and S2  ABDOMEN:  Soft, nontender    Cervix:             Dilation:  Closed        SSE-no vaginal bleeding visualized               Fetal heart rate:         Baseline Heart Rate:  130        Accelerations:  present       Decelerations: absent       Variability:  moderate    Contraction frequency: 0 minutes          ASSESSMENT & PLAN:   Discussed with

## 2022-03-21 NOTE — PROGRESS NOTES
Written and oral discharge instructions provided to patient. No questions at this time. Pt ambulatory off unit.

## 2022-04-19 ENCOUNTER — HOSPITAL ENCOUNTER (OUTPATIENT)
Age: 27
Discharge: HOME | End: 2022-04-19
Attending: OBSTETRICS & GYNECOLOGY | Admitting: OBSTETRICS & GYNECOLOGY
Payer: MEDICAID

## 2022-04-19 VITALS
RESPIRATION RATE: 16 BRPM | HEART RATE: 93 BPM | DIASTOLIC BLOOD PRESSURE: 65 MMHG | TEMPERATURE: 97.9 F | SYSTOLIC BLOOD PRESSURE: 116 MMHG

## 2022-04-19 PROCEDURE — 59025 FETAL NON-STRESS TEST: CPT | Performed by: ADVANCED PRACTICE MIDWIFE

## 2022-04-19 PROCEDURE — 59025 FETAL NON-STRESS TEST: CPT

## 2022-04-19 NOTE — PROGRESS NOTES
Pt presents to l and d from home for a scheduled non stress test for which pt believes is because baby is measuring small per Dr. Ada Adam.  31 . Pt states she feels good fetal movement. Pt denies any leaking of fluid or vaginal bleeding.   House officer to evaluate nst.

## 2022-04-19 NOTE — PROGRESS NOTES
Patient is ,No LMP recorded. Patient is pregnant. ,  31w5d here for NST.     Estimated Date of Delivery: 22    Reason for NST IUGR    /65   Pulse 93   Temp 97.9 °F (36.6 °C) (Oral)   Resp 16     FHR:130,  Variability: moderate,   Accelerations: present, Decelerations: none    Assessment NST is Reactive, Category 1 tracing    Mayela Ugarte, CHASIDY - GALINDO

## 2022-04-26 ENCOUNTER — APPOINTMENT (OUTPATIENT)
Dept: LABOR AND DELIVERY | Age: 27
End: 2022-04-26
Payer: MEDICAID

## 2022-04-26 ENCOUNTER — HOSPITAL ENCOUNTER (OUTPATIENT)
Age: 27
Discharge: HOME | End: 2022-04-26
Attending: OBSTETRICS & GYNECOLOGY | Admitting: OBSTETRICS & GYNECOLOGY
Payer: MEDICAID

## 2022-04-26 VITALS
DIASTOLIC BLOOD PRESSURE: 64 MMHG | SYSTOLIC BLOOD PRESSURE: 118 MMHG | WEIGHT: 176 LBS | TEMPERATURE: 98.2 F | BODY MASS INDEX: 26.67 KG/M2 | HEIGHT: 68 IN | RESPIRATION RATE: 20 BRPM | HEART RATE: 94 BPM

## 2022-04-26 PROCEDURE — 59025 FETAL NON-STRESS TEST: CPT

## 2022-04-26 NOTE — PROGRESS NOTES
Patient is ,No LMP recorded. Patient is pregnant. ,  32w5d here for NST.     Estimated Date of Delivery: 22    Reason for NST: IUGR    /64   Pulse 94   Temp 98.2 °F (36.8 °C)   Resp 20   Ht 5' 8\" (1.727 m)   Wt 176 lb (79.8 kg)   BMI 26.76 kg/m²     FHR:130 Variability: moderate,   Accelerations: present, Decelerations: none  Contractions: none  Assessment NST is Reactive, category 1 tracing      CHASIDY Mohr - GALINDO

## 2022-04-28 ENCOUNTER — APPOINTMENT (OUTPATIENT)
Dept: LABOR AND DELIVERY | Age: 27
End: 2022-04-28
Payer: MEDICAID

## 2022-04-28 ENCOUNTER — HOSPITAL ENCOUNTER (OUTPATIENT)
Age: 27
Setting detail: OBSERVATION
Discharge: HOME | End: 2022-04-29
Attending: OBSTETRICS & GYNECOLOGY | Admitting: OBSTETRICS & GYNECOLOGY
Payer: MEDICAID

## 2022-04-28 VITALS
HEART RATE: 116 BPM | RESPIRATION RATE: 17 BRPM | TEMPERATURE: 99.3 F | DIASTOLIC BLOOD PRESSURE: 83 MMHG | SYSTOLIC BLOOD PRESSURE: 127 MMHG

## 2022-04-28 RX ORDER — SODIUM CHLORIDE, SODIUM LACTATE, POTASSIUM CHLORIDE, AND CALCIUM CHLORIDE .6; .31; .03; .02 G/100ML; G/100ML; G/100ML; G/100ML
500 INJECTION, SOLUTION INTRAVENOUS ONCE
Status: DISCONTINUED | OUTPATIENT
Start: 2022-04-28 | End: 2022-04-29 | Stop reason: HOSPADM

## 2022-04-28 RX ORDER — SODIUM CHLORIDE, SODIUM LACTATE, POTASSIUM CHLORIDE, CALCIUM CHLORIDE 600; 310; 30; 20 MG/100ML; MG/100ML; MG/100ML; MG/100ML
INJECTION, SOLUTION INTRAVENOUS CONTINUOUS
Status: DISCONTINUED | OUTPATIENT
Start: 2022-04-28 | End: 2022-04-29 | Stop reason: HOSPADM

## 2022-04-28 NOTE — PROGRESS NOTES
Here for scheduled nst  iup at 33 wks. States baby is measuring small  Denies ctx's, lof or vb. nfm  Bp= 120/77 afebrile  Has a cough and drainage since yesterday  Denies dyspnea,chest pain or fever  Reviewed appropriate medications in pregnancy for above symptoms  fht's baseline 140-150 with accelerations, accelerations reaching 210 beats/min. I spoke to dr Flakita Baron who is covering dr Pablito Manuel.  Wants to continue to monitor

## 2022-04-29 PROBLEM — Z3A.33 33 WEEKS GESTATION OF PREGNANCY: Status: ACTIVE | Noted: 2022-04-29

## 2022-04-29 PROCEDURE — 59025 FETAL NON-STRESS TEST: CPT

## 2022-04-29 PROCEDURE — 99211 OFF/OP EST MAY X REQ PHY/QHP: CPT

## 2022-04-29 PROCEDURE — 36415 COLL VENOUS BLD VENIPUNCTURE: CPT

## 2022-04-29 NOTE — PROGRESS NOTES
Assume care of patient. Pt states she feels fetal movement. Denies LOF, VB, or cramping at this time. Pt resting comfortably in bed. Call bell within reach. VSS.

## 2022-04-29 NOTE — PROGRESS NOTES
Dr. Latoya Gibson at nurses station. Reviewed tracing.  Would like patient to get IV fluids due to fetal and maternal tachycardia

## 2022-04-29 NOTE — PROGRESS NOTES
Dr. Uma Lui notified of baseline of 145, moderate variability and accelerations, no periods of tachycardia since 2100. Patient has had 2L of fluid and is feeling better, afebrile. Order received for discharge.

## 2022-05-04 ENCOUNTER — APPOINTMENT (OUTPATIENT)
Dept: LABOR AND DELIVERY | Age: 27
End: 2022-05-04
Payer: MEDICAID

## 2022-05-04 ENCOUNTER — HOSPITAL ENCOUNTER (OUTPATIENT)
Age: 27
Discharge: HOME | End: 2022-05-04
Attending: OBSTETRICS & GYNECOLOGY | Admitting: OBSTETRICS & GYNECOLOGY
Payer: MEDICAID

## 2022-05-04 VITALS
DIASTOLIC BLOOD PRESSURE: 73 MMHG | OXYGEN SATURATION: 99 % | SYSTOLIC BLOOD PRESSURE: 107 MMHG | HEART RATE: 110 BPM | WEIGHT: 176 LBS | HEIGHT: 68 IN | RESPIRATION RATE: 16 BRPM | BODY MASS INDEX: 26.67 KG/M2 | TEMPERATURE: 97.5 F

## 2022-05-04 PROCEDURE — 59025 FETAL NON-STRESS TEST: CPT | Performed by: NURSE PRACTITIONER

## 2022-05-04 PROCEDURE — 59025 FETAL NON-STRESS TEST: CPT

## 2022-05-04 NOTE — PROGRESS NOTES
Magdalene Mullen presents to unit for scheduled NST d/t IUGR. Pt denies vaginal bleeding, lof, and contractions. Pt perceives positive fetal movement. 33 weeks 6 days gestation.  LA: 6/16/22

## 2022-05-04 NOTE — PROGRESS NOTES
Patient is ,No LMP recorded. Patient is pregnant. ,  33w6d here for NST.     Estimated Date of Delivery: 22    Reason for NST:IUGR    Ht 5' 8\" (1.727 m)   Wt 176 lb (79.8 kg)   BMI 26.76 kg/m²     FHR:130,  Variability: moderate,   Accelerations: present, Decelerations: none    Assessment NST is Reactive

## 2022-05-06 ENCOUNTER — APPOINTMENT (OUTPATIENT)
Dept: LABOR AND DELIVERY | Age: 27
End: 2022-05-06
Payer: MEDICAID

## 2022-05-06 ENCOUNTER — HOSPITAL ENCOUNTER (OUTPATIENT)
Age: 27
Discharge: HOME | End: 2022-05-06
Attending: OBSTETRICS & GYNECOLOGY | Admitting: OBSTETRICS & GYNECOLOGY
Payer: MEDICAID

## 2022-05-06 VITALS
DIASTOLIC BLOOD PRESSURE: 67 MMHG | RESPIRATION RATE: 16 BRPM | TEMPERATURE: 98.5 F | HEART RATE: 90 BPM | SYSTOLIC BLOOD PRESSURE: 116 MMHG

## 2022-05-06 PROCEDURE — 59025 FETAL NON-STRESS TEST: CPT | Performed by: ADVANCED PRACTICE MIDWIFE

## 2022-05-06 PROCEDURE — 59025 FETAL NON-STRESS TEST: CPT

## 2022-05-06 NOTE — PROGRESS NOTES
Patient is ,No LMP recorded. Patient is pregnant. ,  34w1d here for NST.     Estimated Date of Delivery: 22    Reason for NST:IUGR    166/60, 88, 16, 98.2    FHR:140,  Variability: moderate,   Accelerations: present, Decelerations: none    Assessment NST is Reactive, category 1 tracing    Mayela Ugarte, CHASIDY - GALINDO

## 2022-05-09 ENCOUNTER — HOSPITAL ENCOUNTER (OUTPATIENT)
Age: 27
Discharge: HOME | End: 2022-05-09
Attending: OBSTETRICS & GYNECOLOGY | Admitting: OBSTETRICS & GYNECOLOGY
Payer: MEDICAID

## 2022-05-09 ENCOUNTER — APPOINTMENT (OUTPATIENT)
Dept: LABOR AND DELIVERY | Age: 27
End: 2022-05-09
Payer: MEDICAID

## 2022-05-09 VITALS
DIASTOLIC BLOOD PRESSURE: 69 MMHG | RESPIRATION RATE: 16 BRPM | SYSTOLIC BLOOD PRESSURE: 118 MMHG | TEMPERATURE: 98.1 F | HEART RATE: 87 BPM

## 2022-05-09 PROBLEM — O28.8 NON-REACTIVE NST (NON-STRESS TEST): Status: ACTIVE | Noted: 2022-05-09

## 2022-05-09 PROCEDURE — 59025 FETAL NON-STRESS TEST: CPT

## 2022-05-09 PROCEDURE — 59025 FETAL NON-STRESS TEST: CPT | Performed by: MIDWIFE

## 2022-05-09 NOTE — PROGRESS NOTES
Pt presents to antepartum for scheduled nst. States she woke up with some cramping this morning. Denies any contractions, lof, vb, states good fetal movement. Placed on efm and vitals taken.

## 2022-05-09 NOTE — H&P
Patient is ,No LMP recorded. Patient is pregnant. ,  34w4d here for NST.     Estimated Date of Delivery: 22    Reason for NST: IUGR    /69   Pulse 87   Temp 98.1 °F (36.7 °C) (Oral)   Resp 16     Contractions: none    FHR:135,  Variability: moderate,   Accelerations: present, Decelerations: none    Impression:  Assessment NST is reactive  Plan:

## 2022-05-12 ENCOUNTER — HOSPITAL ENCOUNTER (OUTPATIENT)
Age: 27
Discharge: HOME | End: 2022-05-12
Attending: OBSTETRICS & GYNECOLOGY | Admitting: OBSTETRICS & GYNECOLOGY
Payer: MEDICAID

## 2022-05-12 ENCOUNTER — APPOINTMENT (OUTPATIENT)
Dept: LABOR AND DELIVERY | Age: 27
End: 2022-05-12
Payer: MEDICAID

## 2022-05-12 VITALS — RESPIRATION RATE: 16 BRPM | DIASTOLIC BLOOD PRESSURE: 72 MMHG | SYSTOLIC BLOOD PRESSURE: 118 MMHG | HEART RATE: 90 BPM

## 2022-05-12 PROCEDURE — 99211 OFF/OP EST MAY X REQ PHY/QHP: CPT

## 2022-05-12 PROCEDURE — 59025 FETAL NON-STRESS TEST: CPT

## 2022-05-12 NOTE — PROGRESS NOTES
iup at 35 wks for scheduled nst for iugr  Denies ctx's,lof or vb. nfm  Vr=633/72  fht's reactive  Nurse to notify attending

## 2022-05-12 NOTE — PROGRESS NOTES
Pt presents to unit for NST. Pt denies LOF, VB, and ctx at this time. Pt perceives positive fetal movement. Pt placed on EFM resting comfortable in bed with call bell within reach.

## 2022-05-17 ENCOUNTER — HOSPITAL ENCOUNTER (OUTPATIENT)
Age: 27
Discharge: HOME | End: 2022-05-17
Attending: OBSTETRICS & GYNECOLOGY | Admitting: OBSTETRICS & GYNECOLOGY
Payer: MEDICAID

## 2022-05-17 ENCOUNTER — APPOINTMENT (OUTPATIENT)
Dept: LABOR AND DELIVERY | Age: 27
End: 2022-05-17
Payer: MEDICAID

## 2022-05-17 VITALS — DIASTOLIC BLOOD PRESSURE: 63 MMHG | TEMPERATURE: 98.1 F | HEART RATE: 86 BPM | SYSTOLIC BLOOD PRESSURE: 105 MMHG

## 2022-05-17 PROCEDURE — 59025 FETAL NON-STRESS TEST: CPT

## 2022-05-17 PROCEDURE — 59025 FETAL NON-STRESS TEST: CPT | Performed by: ADVANCED PRACTICE MIDWIFE

## 2022-05-17 NOTE — PROGRESS NOTES
Patient is ,No LMP recorded. Patient is pregnant. ,  35w5d here for NST.     Estimated Date of Delivery: 22    Reason for NST:IUGR    /63   Pulse 86   Temp 98.1 °F (36.7 °C)     FHR:140,  Variability: moderate,   Accelerations: present, Decelerations: none    Assessment NST is Reactive, category 1 tracing    Mayela Ugarte, CHASIDY - GALINDO

## 2022-05-19 ENCOUNTER — APPOINTMENT (OUTPATIENT)
Dept: LABOR AND DELIVERY | Age: 27
End: 2022-05-19
Payer: MEDICAID

## 2022-05-19 ENCOUNTER — HOSPITAL ENCOUNTER (OUTPATIENT)
Age: 27
Discharge: HOME | End: 2022-05-19
Attending: OBSTETRICS & GYNECOLOGY | Admitting: OBSTETRICS & GYNECOLOGY
Payer: MEDICAID

## 2022-05-19 VITALS
TEMPERATURE: 98 F | RESPIRATION RATE: 16 BRPM | SYSTOLIC BLOOD PRESSURE: 111 MMHG | HEART RATE: 82 BPM | DIASTOLIC BLOOD PRESSURE: 72 MMHG

## 2022-05-19 PROBLEM — Z3A.36 36 WEEKS GESTATION OF PREGNANCY: Status: ACTIVE | Noted: 2022-05-19

## 2022-05-19 PROCEDURE — 59025 FETAL NON-STRESS TEST: CPT | Performed by: ADVANCED PRACTICE MIDWIFE

## 2022-05-19 PROCEDURE — 59025 FETAL NON-STRESS TEST: CPT

## 2022-05-19 NOTE — PROGRESS NOTES
36.0 of Dr Julianna Olson presents for scheduled nst due to baby measuring small. Denies vb, lof,. occassional contraction, +fetal movement.

## 2022-05-20 NOTE — PROGRESS NOTES
Patient is ,No LMP recorded. Patient is pregnant. ,  36w0d here for NST.     Estimated Date of Delivery: 22    Reason for NST:IUGR    /72   Pulse 82   Temp 98 °F (36.7 °C) (Oral)   Resp 16     FHR:140,  Variability: moderate,   Accelerations: present, Decelerations: none    Assessment NST is Reactive, category 1 tracing    CHASIDY Mohr CNM

## 2022-05-24 ENCOUNTER — HOSPITAL ENCOUNTER (OUTPATIENT)
Age: 27
Discharge: HOME | End: 2022-05-24
Attending: OBSTETRICS & GYNECOLOGY | Admitting: OBSTETRICS & GYNECOLOGY
Payer: MEDICAID

## 2022-05-24 ENCOUNTER — APPOINTMENT (OUTPATIENT)
Dept: LABOR AND DELIVERY | Age: 27
End: 2022-05-24
Payer: MEDICAID

## 2022-05-24 VITALS
TEMPERATURE: 97.7 F | SYSTOLIC BLOOD PRESSURE: 118 MMHG | DIASTOLIC BLOOD PRESSURE: 68 MMHG | HEART RATE: 90 BPM | RESPIRATION RATE: 16 BRPM

## 2022-05-24 PROCEDURE — 59025 FETAL NON-STRESS TEST: CPT

## 2022-05-24 PROCEDURE — 59025 FETAL NON-STRESS TEST: CPT | Performed by: ADVANCED PRACTICE MIDWIFE

## 2022-05-24 NOTE — PROGRESS NOTES
Pt NST was reactive per LANI Rayo and pt was given the okay to be discharged by LANI Rayo. I handed the pt a copy of her discharge instructions and verbally went over the discharge instructions with her. Pt stated that at the time she has no questions, Pt left the unit ambulatory.

## 2022-05-24 NOTE — PROGRESS NOTES
Patient is ,No LMP recorded. Patient is pregnant. ,  36w5d here for NST.     Estimated Date of Delivery: 22    Reason for NST: IUGR    /68   Pulse 90   Temp 97.7 °F (36.5 °C) (Oral)   Resp 16     Contractions: none    FHR:145,  Variability: moderate,   Accelerations: present, Decelerations: none    Assessment NST is reactive     NST also read by Dr Kain Paulino

## 2022-05-27 ENCOUNTER — HOSPITAL ENCOUNTER (OUTPATIENT)
Age: 27
Discharge: HOME | End: 2022-05-27
Attending: OBSTETRICS & GYNECOLOGY | Admitting: OBSTETRICS & GYNECOLOGY
Payer: MEDICAID

## 2022-05-27 ENCOUNTER — APPOINTMENT (OUTPATIENT)
Dept: LABOR AND DELIVERY | Age: 27
End: 2022-05-27
Payer: MEDICAID

## 2022-05-27 VITALS
TEMPERATURE: 97.8 F | DIASTOLIC BLOOD PRESSURE: 68 MMHG | HEART RATE: 99 BPM | SYSTOLIC BLOOD PRESSURE: 117 MMHG | RESPIRATION RATE: 12 BRPM

## 2022-05-27 PROCEDURE — 99211 OFF/OP EST MAY X REQ PHY/QHP: CPT

## 2022-05-27 PROCEDURE — 59025 FETAL NON-STRESS TEST: CPT | Performed by: ADVANCED PRACTICE MIDWIFE

## 2022-05-27 PROCEDURE — 59025 FETAL NON-STRESS TEST: CPT

## 2022-05-27 NOTE — PROGRESS NOTES
Patient is ,No LMP recorded. Patient is pregnant. ,  37w1d here for NST.     Estimated Date of Delivery: 22    Reason for NST:IUGR    /68   Pulse 99   Temp 97.8 °F (36.6 °C) (Oral)   Resp 12     FHR:150,  Variability: moderate,   Accelerations: present, Decelerations: none    Assessment NST is Reactive, category 1 tracing    Mayela Ugarte, CHASIDY - GALINDO

## 2022-05-29 ENCOUNTER — HOSPITAL ENCOUNTER (OUTPATIENT)
Age: 27
Setting detail: OBSERVATION
Discharge: HOME | DRG: 560 | End: 2022-05-29
Attending: OBSTETRICS & GYNECOLOGY | Admitting: OBSTETRICS & GYNECOLOGY
Payer: MEDICAID

## 2022-05-29 VITALS
RESPIRATION RATE: 16 BRPM | SYSTOLIC BLOOD PRESSURE: 124 MMHG | HEART RATE: 82 BPM | TEMPERATURE: 98.5 F | DIASTOLIC BLOOD PRESSURE: 73 MMHG

## 2022-05-29 PROBLEM — Z34.93 PREGNANT AND NOT YET DELIVERED IN THIRD TRIMESTER: Status: ACTIVE | Noted: 2022-05-29

## 2022-05-29 LAB
ALBUMIN SERPL-MCNC: 3.9 G/DL (ref 3.5–5.2)
ALP BLD-CCNC: 131 U/L (ref 35–104)
ALT SERPL-CCNC: 10 U/L (ref 0–32)
ANION GAP SERPL CALCULATED.3IONS-SCNC: 15 MMOL/L (ref 7–16)
AST SERPL-CCNC: 30 U/L (ref 0–31)
BACTERIA: ABNORMAL /HPF
BASOPHILS ABSOLUTE: 0.03 E9/L (ref 0–0.2)
BASOPHILS RELATIVE PERCENT: 0.3 % (ref 0–2)
BILIRUB SERPL-MCNC: 0.2 MG/DL (ref 0–1.2)
BILIRUBIN URINE: NEGATIVE
BLOOD, URINE: NEGATIVE
BUN BLDV-MCNC: 6 MG/DL (ref 6–20)
CALCIUM SERPL-MCNC: 9.1 MG/DL (ref 8.6–10.2)
CHLORIDE BLD-SCNC: 102 MMOL/L (ref 98–107)
CLARITY: CLEAR
CO2: 22 MMOL/L (ref 22–29)
COLOR: YELLOW
CREAT SERPL-MCNC: 0.6 MG/DL (ref 0.5–1)
EOSINOPHILS ABSOLUTE: 0.03 E9/L (ref 0.05–0.5)
EOSINOPHILS RELATIVE PERCENT: 0.3 % (ref 0–6)
GFR AFRICAN AMERICAN: >60
GFR NON-AFRICAN AMERICAN: >60 ML/MIN/1.73
GLUCOSE BLD-MCNC: 78 MG/DL (ref 74–99)
GLUCOSE URINE: NEGATIVE MG/DL
HCT VFR BLD CALC: 34.8 % (ref 34–48)
HEMOGLOBIN: 11.7 G/DL (ref 11.5–15.5)
IMMATURE GRANULOCYTES #: 0.07 E9/L
IMMATURE GRANULOCYTES %: 0.7 % (ref 0–5)
KETONES, URINE: ABNORMAL MG/DL
LEUKOCYTE ESTERASE, URINE: ABNORMAL
LYMPHOCYTES ABSOLUTE: 1.67 E9/L (ref 1.5–4)
LYMPHOCYTES RELATIVE PERCENT: 15.9 % (ref 20–42)
MCH RBC QN AUTO: 30.2 PG (ref 26–35)
MCHC RBC AUTO-ENTMCNC: 33.6 % (ref 32–34.5)
MCV RBC AUTO: 89.9 FL (ref 80–99.9)
MONOCYTES ABSOLUTE: 0.78 E9/L (ref 0.1–0.95)
MONOCYTES RELATIVE PERCENT: 7.4 % (ref 2–12)
NEUTROPHILS ABSOLUTE: 7.91 E9/L (ref 1.8–7.3)
NEUTROPHILS RELATIVE PERCENT: 75.4 % (ref 43–80)
NITRITE, URINE: NEGATIVE
PDW BLD-RTO: 13.3 FL (ref 11.5–15)
PH UA: 7.5 (ref 5–9)
PLATELET # BLD: 214 E9/L (ref 130–450)
PMV BLD AUTO: 10.6 FL (ref 7–12)
POTASSIUM SERPL-SCNC: 4.3 MMOL/L (ref 3.5–5)
PROTEIN UA: NEGATIVE MG/DL
RBC # BLD: 3.87 E12/L (ref 3.5–5.5)
RBC UA: ABNORMAL /HPF (ref 0–2)
SODIUM BLD-SCNC: 139 MMOL/L (ref 132–146)
SPECIFIC GRAVITY UA: 1.01 (ref 1–1.03)
TOTAL PROTEIN: 7.6 G/DL (ref 6.4–8.3)
UROBILINOGEN, URINE: 2 E.U./DL
WBC # BLD: 10.5 E9/L (ref 4.5–11.5)
WBC UA: ABNORMAL /HPF (ref 0–5)

## 2022-05-29 PROCEDURE — 81001 URINALYSIS AUTO W/SCOPE: CPT

## 2022-05-29 PROCEDURE — 80053 COMPREHEN METABOLIC PANEL: CPT

## 2022-05-29 PROCEDURE — G0378 HOSPITAL OBSERVATION PER HR: HCPCS

## 2022-05-29 PROCEDURE — 99211 OFF/OP EST MAY X REQ PHY/QHP: CPT

## 2022-05-29 PROCEDURE — 36415 COLL VENOUS BLD VENIPUNCTURE: CPT

## 2022-05-29 PROCEDURE — 96360 HYDRATION IV INFUSION INIT: CPT

## 2022-05-29 PROCEDURE — 85025 COMPLETE CBC W/AUTO DIFF WBC: CPT

## 2022-05-29 PROCEDURE — 2580000003 HC RX 258: Performed by: OBSTETRICS & GYNECOLOGY

## 2022-05-29 RX ORDER — DEXTROSE, SODIUM CHLORIDE, SODIUM LACTATE, POTASSIUM CHLORIDE, AND CALCIUM CHLORIDE 5; .6; .31; .03; .02 G/100ML; G/100ML; G/100ML; G/100ML; G/100ML
INJECTION, SOLUTION INTRAVENOUS CONTINUOUS
Status: DISCONTINUED | OUTPATIENT
Start: 2022-05-29 | End: 2022-05-29 | Stop reason: HOSPADM

## 2022-05-29 RX ADMIN — SODIUM CHLORIDE, SODIUM LACTATE, POTASSIUM CHLORIDE, CALCIUM CHLORIDE AND DEXTROSE MONOHYDRATE: 5; 600; 310; 30; 20 INJECTION, SOLUTION INTRAVENOUS at 19:36

## 2022-05-29 NOTE — H&P
Department of Obstetrics and Gynecology  Attending Obstetrics History and Physical        CHIEF COMPLAINT: Decreased fetal movement with nausea vomiting and loose stools at home     HISTORY OF PRESENT ILLNESS:      The patient is a 33 y.o. female [id number], No LMP recorded. Patient is pregnant. ,  at 37w3d. OB History        5    Para   3    Term   3            AB   1    Living   3       SAB   1    IAB        Ectopic        Molar        Multiple   0    Live Births   3            Patient presents with a chief complaint as above.     Estimated Due Date: Estimated Date of Delivery: 22    PRENATAL CARE:    Complicated by:   Patient Active Problem List   Diagnosis Code    Hyperemesis R11.10    32 weeks gestation of pregnancy Z3A.32    Spotting affecting pregnancy in third trimester O26.26    34 weeks gestation of pregnancy Z3A.34    Herpes zoster without complication D06.1    38 weeks gestation of pregnancy Z3A.38    Herpes B00.9    Normal pregnancy, unspecified trimester Z34.90    Vaginal bleeding in pregnancy, second trimester O47.80    31 weeks gestation of pregnancy Z3A.31    Poor fetal growth, affecting management of mother, antepartum condition or complication D20.6316    33 weeks gestation of pregnancy Z3A.33    Non-reactive NST (non-stress test) O28.8    35 weeks gestation of pregnancy Z3A.35    36 weeks gestation of pregnancy Z3A.36    Pregnant and not yet delivered in third trimester Z34.93       PAST OB HISTORY  OB History        5    Para   3    Term   3            AB   1    Living   3       SAB   1    IAB        Ectopic        Molar        Multiple   0    Live Births   3                Past Medical History:        Diagnosis Date    Diabetes mellitus ([facility] [address] 2019    previous gdm    Herpes 2019    Mental disorder     anxiety    Rh incompatibility      Past Surgical History:        Procedure Laterality Date    WISDOM TOOTH EXTRACTION       Social History: TOBACCO:   reports that she has never smoked. She has never used smokeless tobacco.  ETOH:   reports no history of alcohol use. DRUGS:   reports current drug use. Drug: Marijuana Dauna Other). Family History:   History reviewed. No pertinent family history. Medications Prior to Admission:  Medications Prior to Admission: Prenatal Vit-Fe Fumarate-FA (PRENATAL 1+1 PO), Take by mouth  Allergies:  Patient has no known allergies. Review of Systems:   Ears, nose, mouth, throat, and face: negative  Respiratory: negative  Cardiovascular: negative  Gastrointestinal: negative  Genitourinary:negative  Integument/breast: negative  Hematologic/lymphatic: negative  Musculoskeletal:negative  Neurological: negative  Behavioral/Psych: negative  Endocrine: negative  Allergic/Immunologic: negative    PHYSICAL EXAM:    General appearance:  awake, alert, cooperative, no apparent distress, and appears stated age  Neurologic:  Awake, alert, oriented to name, place and time. Cranial nerves II-XII are grossly intact. Motor is 5 out of 5 bilaterally. Cerebellar finger to nose, heel to shin intact. Sensory is intact.   Babinski down going, Romberg negative, and gait is normal.  Lungs:  No increased work of breathing, good air exchange, clear to auscultation bilaterally, no crackles or wheezing  Heart:  Normal apical impulse, regular rate and rhythm, normal S1 and S2, no S3 or S4, and no murmur noted  Abdomen:  No scars, normal bowel sounds, soft, non-distended, non-tender, no masses palpated, no hepatosplenomegally  Fetal heart rate:  Baseline Heart Rate 140, accelerations: Present, decelerations: Absent  Pelvis:  External Genitalia: General appearance; normal, Hair distribution; normal, Lesions absent  Contraction frequency: 0 minutes  Membranes: Intact    /65   Pulse 90   Temp 97.8 °F (36.6 °C) (Oral)   Resp 16     General Labs:    CBC:   Lab Results   Component Value Date    WBC 9.7 07/10/2021    RBC 4.84 07/10/2021    HGB 14.0 07/10/2021    HCT 42.9 07/10/2021    MCV 88.6 07/10/2021    RDW 13.2 07/10/2021     07/10/2021     CMP:    Lab Results   Component Value Date     07/10/2021    K 3.9 07/10/2021     07/10/2021    CO2 28 07/10/2021    BUN 11 07/10/2021    PROT 8.1 07/10/2021     U/A:  No components found for: Gwenevere Maria T, USPGRAV, UPH, UPROTEIN, UGLUCOSE, UKETONE, UBILI, UBLOOD, UNITRITE, UUROBIL, ULEUKEST, USQEPI, Cartwright, UWBC, Gerardo, Synchari, UHYALINE    ASSESSMENT AND PLAN:  IUP at 37 weeks  Decreased fetal movement nausea vomiting  Dr. Colby Quintero notified      Electronically signed by Tong Martinez MD on 5/29/2022 at 6:45 PM

## 2022-05-29 NOTE — PROGRESS NOTES
Gayla Li presents to unit with c/o decreased fetal movement. Diarrhea x1 and nausea. Pt denies vaginal bleeding and lof. States she has had some abdominal tightening, but states is is not painful. Placed on efm. NST button provided.  Call light within reach

## 2022-05-30 NOTE — PROGRESS NOTES
Updated Dr. Bozena Cespedes on pt labs. UA just showed trace ketones and small leukocytes. New orders that patient can go home, keep scheduled appointments and drinks lots of fluids.

## 2022-05-30 NOTE — PROGRESS NOTES
Patient given written and verbal discharge instructions. Patient verbalizes understanding and understands when to call her doctor/come back to the hospital and denies having any questions at this time. Pt ambulated off unit.

## 2022-06-01 ENCOUNTER — APPOINTMENT (OUTPATIENT)
Dept: LABOR AND DELIVERY | Age: 27
DRG: 560 | End: 2022-06-01
Payer: MEDICAID

## 2022-06-01 ENCOUNTER — HOSPITAL ENCOUNTER (INPATIENT)
Age: 27
LOS: 3 days | Discharge: HOME | DRG: 560 | End: 2022-06-04
Attending: OBSTETRICS & GYNECOLOGY | Admitting: OBSTETRICS & GYNECOLOGY
Payer: MEDICAID

## 2022-06-01 PROBLEM — Z3A.37 37 WEEKS GESTATION OF PREGNANCY: Status: ACTIVE | Noted: 2022-06-01

## 2022-06-01 PROBLEM — Z34.90 NORMAL PREGNANCY, ANTEPARTUM: Status: ACTIVE | Noted: 2022-06-01

## 2022-06-01 PROBLEM — Z36.89 NST (NON-STRESS TEST) REACTIVE: Status: ACTIVE | Noted: 2022-06-01

## 2022-06-01 LAB
ABO/RH: NORMAL
AMPHETAMINE SCREEN, URINE: NOT DETECTED
ANTIBODY SCREEN: NORMAL
BARBITURATE SCREEN URINE: NOT DETECTED
BENZODIAZEPINE SCREEN, URINE: NOT DETECTED
CANNABINOID SCREEN URINE: NOT DETECTED
COCAINE METABOLITE SCREEN URINE: NOT DETECTED
FENTANYL SCREEN, URINE: NOT DETECTED
HCT VFR BLD CALC: 32.4 % (ref 34–48)
HEMOGLOBIN: 10.9 G/DL (ref 11.5–15.5)
Lab: NORMAL
MCH RBC QN AUTO: 29.9 PG (ref 26–35)
MCHC RBC AUTO-ENTMCNC: 33.6 % (ref 32–34.5)
MCV RBC AUTO: 89 FL (ref 80–99.9)
METHADONE SCREEN, URINE: NOT DETECTED
OPIATE SCREEN URINE: NOT DETECTED
OXYCODONE URINE: NOT DETECTED
PDW BLD-RTO: 13.3 FL (ref 11.5–15)
PHENCYCLIDINE SCREEN URINE: NOT DETECTED
PLATELET # BLD: 190 E9/L (ref 130–450)
PMV BLD AUTO: 10.5 FL (ref 7–12)
RBC # BLD: 3.64 E12/L (ref 3.5–5.5)
WBC # BLD: 10.8 E9/L (ref 4.5–11.5)

## 2022-06-01 PROCEDURE — 86901 BLOOD TYPING SEROLOGIC RH(D): CPT

## 2022-06-01 PROCEDURE — 6370000000 HC RX 637 (ALT 250 FOR IP): Performed by: OBSTETRICS & GYNECOLOGY

## 2022-06-01 PROCEDURE — 99221 1ST HOSP IP/OBS SF/LOW 40: CPT | Performed by: NURSE PRACTITIONER

## 2022-06-01 PROCEDURE — 59025 FETAL NON-STRESS TEST: CPT | Performed by: NURSE PRACTITIONER

## 2022-06-01 PROCEDURE — 86850 RBC ANTIBODY SCREEN: CPT

## 2022-06-01 PROCEDURE — 1220000001 HC SEMI PRIVATE L&D R&B

## 2022-06-01 PROCEDURE — 85027 COMPLETE CBC AUTOMATED: CPT

## 2022-06-01 PROCEDURE — 80307 DRUG TEST PRSMV CHEM ANLYZR: CPT

## 2022-06-01 PROCEDURE — 86900 BLOOD TYPING SEROLOGIC ABO: CPT

## 2022-06-01 PROCEDURE — 2580000003 HC RX 258: Performed by: OBSTETRICS & GYNECOLOGY

## 2022-06-01 PROCEDURE — 36415 COLL VENOUS BLD VENIPUNCTURE: CPT

## 2022-06-01 RX ORDER — SODIUM CHLORIDE, SODIUM LACTATE, POTASSIUM CHLORIDE, CALCIUM CHLORIDE 600; 310; 30; 20 MG/100ML; MG/100ML; MG/100ML; MG/100ML
INJECTION, SOLUTION INTRAVENOUS CONTINUOUS
Status: DISCONTINUED | OUTPATIENT
Start: 2022-06-01 | End: 2022-06-02

## 2022-06-01 RX ORDER — SODIUM CHLORIDE 9 MG/ML
25 INJECTION, SOLUTION INTRAVENOUS PRN
Status: DISCONTINUED | OUTPATIENT
Start: 2022-06-01 | End: 2022-06-02

## 2022-06-01 RX ORDER — ONDANSETRON 2 MG/ML
4 INJECTION INTRAMUSCULAR; INTRAVENOUS EVERY 6 HOURS PRN
Status: DISCONTINUED | OUTPATIENT
Start: 2022-06-01 | End: 2022-06-02

## 2022-06-01 RX ORDER — ACETAMINOPHEN 650 MG
TABLET, EXTENDED RELEASE ORAL
Status: DISCONTINUED
Start: 2022-06-01 | End: 2022-06-02

## 2022-06-01 RX ORDER — SODIUM CHLORIDE 0.9 % (FLUSH) 0.9 %
5-40 SYRINGE (ML) INJECTION EVERY 12 HOURS SCHEDULED
Status: DISCONTINUED | OUTPATIENT
Start: 2022-06-01 | End: 2022-06-02

## 2022-06-01 RX ORDER — DOCUSATE SODIUM 100 MG/1
100 CAPSULE, LIQUID FILLED ORAL 2 TIMES DAILY
Status: DISCONTINUED | OUTPATIENT
Start: 2022-06-01 | End: 2022-06-02

## 2022-06-01 RX ORDER — SODIUM CHLORIDE 0.9 % (FLUSH) 0.9 %
5-40 SYRINGE (ML) INJECTION PRN
Status: DISCONTINUED | OUTPATIENT
Start: 2022-06-01 | End: 2022-06-02

## 2022-06-01 RX ORDER — SODIUM CHLORIDE, SODIUM LACTATE, POTASSIUM CHLORIDE, AND CALCIUM CHLORIDE .6; .31; .03; .02 G/100ML; G/100ML; G/100ML; G/100ML
500 INJECTION, SOLUTION INTRAVENOUS PRN
Status: DISCONTINUED | OUTPATIENT
Start: 2022-06-01 | End: 2022-06-02

## 2022-06-01 RX ORDER — ACETAMINOPHEN 325 MG/1
650 TABLET ORAL EVERY 4 HOURS PRN
Status: DISCONTINUED | OUTPATIENT
Start: 2022-06-01 | End: 2022-06-02

## 2022-06-01 RX ORDER — CALCIUM CARBONATE 200(500)MG
1 TABLET,CHEWABLE ORAL PRN
COMMUNITY

## 2022-06-01 RX ORDER — LIDOCAINE HYDROCHLORIDE 10 MG/ML
INJECTION, SOLUTION INFILTRATION; PERINEURAL
Status: DISCONTINUED
Start: 2022-06-01 | End: 2022-06-02

## 2022-06-01 RX ORDER — ACETAMINOPHEN 325 MG/1
650 TABLET ORAL EVERY 6 HOURS PRN
COMMUNITY

## 2022-06-01 RX ORDER — SODIUM CHLORIDE, SODIUM LACTATE, POTASSIUM CHLORIDE, AND CALCIUM CHLORIDE .6; .31; .03; .02 G/100ML; G/100ML; G/100ML; G/100ML
1000 INJECTION, SOLUTION INTRAVENOUS PRN
Status: DISCONTINUED | OUTPATIENT
Start: 2022-06-01 | End: 2022-06-02

## 2022-06-01 RX ADMIN — SODIUM CHLORIDE, POTASSIUM CHLORIDE, SODIUM LACTATE AND CALCIUM CHLORIDE: 600; 310; 30; 20 INJECTION, SOLUTION INTRAVENOUS at 20:00

## 2022-06-01 RX ADMIN — Medication 25 MCG: at 21:05

## 2022-06-01 NOTE — PROGRESS NOTES
Patient presents to the unit for scheduled NST 37.6 weeks  for IUGR. Patient reports +FM, no LOF or VB. Reports feeling cramping last night however they have subsided since then. Patient placed on the monitor, call light within reach.

## 2022-06-01 NOTE — H&P
Department of Obstetrics and Gynecology  Labor and Delivery  Triage Note      SUBJECTIVE:  Davon Estrella is a 33 y.o. female, [id number], No LMP recorded. Patient is pregnant., Estimated Date of Delivery: 6/16/22, 37w6d, here for scheduled nst for iugr. Pt denies regular ctx, lof, vb or decreased fm.      Prenatal course: iugr    Review of Systems:   Ears, nose, mouth, throat, and face: negative  Respiratory: negative  Cardiovascular: negative  Gastrointestinal: negative  Genitourinary:negative  Integument/breast: negative  Hematologic/lymphatic: negative  Musculoskeletal:negative  Neurological: negative  Behavioral/Psych: negative  Endocrine: negative  Allergic/Immunologic: negative    OBJECTIVE    Vitals:  /69   Pulse 85   Temp 98.2 °F (36.8 °C) (Oral)   Resp 16     General- alert, NAD  Skin- warm and dry, no rashes seen  Psych- normal mood and affect  Neuro- CN II-XII grossly intact  Abdomen: soft, nontender  Cervix deferred    Fetal heart rate:         Baseline Heart Rate:  140        Accelerations:  present       Decelerations:  Late decel x1       Variability:  moderate    Contraction frequency: 2-8 min    ASSESSMENT:    nonreactive nst    Plan: d/w Dr. Patricia Sosa  Late decel on nst  Continue monitoring for now  Will reevaluate in a couple hours

## 2022-06-01 NOTE — PROGRESS NOTES
Patient is ,No LMP recorded. Patient is pregnant. ,  37w6d here for NST.     Estimated Date of Delivery: 22    Reason for NST:iugr    /69   Pulse 85   Temp 98.2 °F (36.8 °C) (Oral)   Resp 16     FHR:140,  Variability: moderate,   Accelerations: present, Decelerations: late decel x1    Assessment NST is non-Reactive

## 2022-06-02 ENCOUNTER — ANESTHESIA EVENT (OUTPATIENT)
Dept: LABOR AND DELIVERY | Age: 27
DRG: 560 | End: 2022-06-02
Payer: MEDICAID

## 2022-06-02 ENCOUNTER — ANESTHESIA (OUTPATIENT)
Dept: LABOR AND DELIVERY | Age: 27
DRG: 560 | End: 2022-06-02
Payer: MEDICAID

## 2022-06-02 PROCEDURE — 3700000025 EPIDURAL BLOCK: Performed by: ANESTHESIOLOGY

## 2022-06-02 PROCEDURE — 2580000003 HC RX 258: Performed by: OBSTETRICS & GYNECOLOGY

## 2022-06-02 PROCEDURE — 6360000002 HC RX W HCPCS

## 2022-06-02 PROCEDURE — 88307 TISSUE EXAM BY PATHOLOGIST: CPT

## 2022-06-02 PROCEDURE — 2500000003 HC RX 250 WO HCPCS: Performed by: ANESTHESIOLOGY

## 2022-06-02 PROCEDURE — 6360000002 HC RX W HCPCS: Performed by: ANESTHESIOLOGY

## 2022-06-02 PROCEDURE — 7200000001 HC VAGINAL DELIVERY

## 2022-06-02 PROCEDURE — 6370000000 HC RX 637 (ALT 250 FOR IP): Performed by: PHYSICIAN ASSISTANT

## 2022-06-02 PROCEDURE — 1220000000 HC SEMI PRIVATE OB R&B

## 2022-06-02 PROCEDURE — 51701 INSERT BLADDER CATHETER: CPT

## 2022-06-02 PROCEDURE — 6360000002 HC RX W HCPCS: Performed by: OBSTETRICS & GYNECOLOGY

## 2022-06-02 PROCEDURE — 6370000000 HC RX 637 (ALT 250 FOR IP)

## 2022-06-02 PROCEDURE — 6370000000 HC RX 637 (ALT 250 FOR IP): Performed by: OBSTETRICS & GYNECOLOGY

## 2022-06-02 RX ORDER — OXYCODONE HYDROCHLORIDE 5 MG/1
5 TABLET ORAL EVERY 4 HOURS PRN
Status: DISCONTINUED | OUTPATIENT
Start: 2022-06-02 | End: 2022-06-02

## 2022-06-02 RX ORDER — SODIUM CHLORIDE 0.9 % (FLUSH) 0.9 %
5-40 SYRINGE (ML) INJECTION EVERY 12 HOURS SCHEDULED
Status: DISCONTINUED | OUTPATIENT
Start: 2022-06-02 | End: 2022-06-04 | Stop reason: HOSPADM

## 2022-06-02 RX ORDER — SODIUM CHLORIDE 0.9 % (FLUSH) 0.9 %
5-40 SYRINGE (ML) INJECTION PRN
Status: DISCONTINUED | OUTPATIENT
Start: 2022-06-02 | End: 2022-06-04 | Stop reason: HOSPADM

## 2022-06-02 RX ORDER — SODIUM CHLORIDE 9 MG/ML
25 INJECTION, SOLUTION INTRAVENOUS PRN
Status: DISCONTINUED | OUTPATIENT
Start: 2022-06-02 | End: 2022-06-04 | Stop reason: HOSPADM

## 2022-06-02 RX ORDER — BISACODYL 10 MG
10 SUPPOSITORY, RECTAL RECTAL DAILY PRN
Status: DISCONTINUED | OUTPATIENT
Start: 2022-06-02 | End: 2022-06-04 | Stop reason: HOSPADM

## 2022-06-02 RX ORDER — KETOROLAC TROMETHAMINE 30 MG/ML
30 INJECTION, SOLUTION INTRAMUSCULAR; INTRAVENOUS EVERY 6 HOURS PRN
Status: DISCONTINUED | OUTPATIENT
Start: 2022-06-02 | End: 2022-06-02

## 2022-06-02 RX ORDER — MODIFIED LANOLIN
OINTMENT (GRAM) TOPICAL PRN
Status: DISCONTINUED | OUTPATIENT
Start: 2022-06-02 | End: 2022-06-04 | Stop reason: HOSPADM

## 2022-06-02 RX ORDER — OXYCODONE HYDROCHLORIDE 5 MG/1
10 TABLET ORAL EVERY 4 HOURS PRN
Status: DISCONTINUED | OUTPATIENT
Start: 2022-06-02 | End: 2022-06-02

## 2022-06-02 RX ORDER — ONDANSETRON 2 MG/ML
4 INJECTION INTRAMUSCULAR; INTRAVENOUS EVERY 6 HOURS PRN
Status: DISCONTINUED | OUTPATIENT
Start: 2022-06-02 | End: 2022-06-02

## 2022-06-02 RX ORDER — SODIUM CHLORIDE 9 MG/ML
INJECTION, SOLUTION INTRAVENOUS PRN
Status: DISCONTINUED | OUTPATIENT
Start: 2022-06-02 | End: 2022-06-04 | Stop reason: HOSPADM

## 2022-06-02 RX ORDER — SODIUM CHLORIDE, SODIUM LACTATE, POTASSIUM CHLORIDE, CALCIUM CHLORIDE 600; 310; 30; 20 MG/100ML; MG/100ML; MG/100ML; MG/100ML
INJECTION, SOLUTION INTRAVENOUS CONTINUOUS
Status: DISCONTINUED | OUTPATIENT
Start: 2022-06-02 | End: 2022-06-04 | Stop reason: HOSPADM

## 2022-06-02 RX ORDER — PANTOPRAZOLE SODIUM 40 MG/1
40 TABLET, DELAYED RELEASE ORAL DAILY PRN
Status: DISCONTINUED | OUTPATIENT
Start: 2022-06-02 | End: 2022-06-04 | Stop reason: HOSPADM

## 2022-06-02 RX ORDER — NALOXONE HYDROCHLORIDE 0.4 MG/ML
INJECTION, SOLUTION INTRAMUSCULAR; INTRAVENOUS; SUBCUTANEOUS PRN
Status: DISCONTINUED | OUTPATIENT
Start: 2022-06-02 | End: 2022-06-02

## 2022-06-02 RX ORDER — ACETAMINOPHEN 500 MG
1000 TABLET ORAL EVERY 6 HOURS PRN
Status: DISCONTINUED | OUTPATIENT
Start: 2022-06-02 | End: 2022-06-04 | Stop reason: HOSPADM

## 2022-06-02 RX ORDER — IBUPROFEN 600 MG/1
TABLET ORAL
Status: COMPLETED
Start: 2022-06-02 | End: 2022-06-02

## 2022-06-02 RX ORDER — IBUPROFEN 800 MG/1
800 TABLET ORAL EVERY 8 HOURS PRN
Status: DISCONTINUED | OUTPATIENT
Start: 2022-06-02 | End: 2022-06-04 | Stop reason: HOSPADM

## 2022-06-02 RX ORDER — IBUPROFEN 800 MG/1
800 TABLET ORAL EVERY 8 HOURS PRN
Status: DISCONTINUED | OUTPATIENT
Start: 2022-06-02 | End: 2022-06-02 | Stop reason: SDUPTHER

## 2022-06-02 RX ORDER — DOCUSATE SODIUM 100 MG/1
100 CAPSULE, LIQUID FILLED ORAL 2 TIMES DAILY
Status: DISCONTINUED | OUTPATIENT
Start: 2022-06-02 | End: 2022-06-04 | Stop reason: HOSPADM

## 2022-06-02 RX ORDER — ONDANSETRON 4 MG/1
4 TABLET, ORALLY DISINTEGRATING ORAL EVERY 6 HOURS PRN
Status: DISCONTINUED | OUTPATIENT
Start: 2022-06-02 | End: 2022-06-04 | Stop reason: HOSPADM

## 2022-06-02 RX ORDER — SIMETHICONE 80 MG
80 TABLET,CHEWABLE ORAL EVERY 6 HOURS PRN
Status: DISCONTINUED | OUTPATIENT
Start: 2022-06-02 | End: 2022-06-04 | Stop reason: HOSPADM

## 2022-06-02 RX ORDER — IBUPROFEN 800 MG/1
800 TABLET ORAL EVERY 8 HOURS PRN
Status: DISCONTINUED | OUTPATIENT
Start: 2022-06-03 | End: 2022-06-02

## 2022-06-02 RX ORDER — FERROUS SULFATE 325(65) MG
325 TABLET ORAL
Status: DISCONTINUED | OUTPATIENT
Start: 2022-06-02 | End: 2022-06-04 | Stop reason: HOSPADM

## 2022-06-02 RX ADMIN — DOCUSATE SODIUM 100 MG: 100 CAPSULE, LIQUID FILLED ORAL at 19:46

## 2022-06-02 RX ADMIN — Medication 15 ML/HR: at 05:39

## 2022-06-02 RX ADMIN — SODIUM CHLORIDE, POTASSIUM CHLORIDE, SODIUM LACTATE AND CALCIUM CHLORIDE: 600; 310; 30; 20 INJECTION, SOLUTION INTRAVENOUS at 03:35

## 2022-06-02 RX ADMIN — ONDANSETRON 4 MG: 2 INJECTION INTRAMUSCULAR; INTRAVENOUS at 06:23

## 2022-06-02 RX ADMIN — Medication 25 MCG: at 01:05

## 2022-06-02 RX ADMIN — DOCUSATE SODIUM 100 MG: 100 CAPSULE, LIQUID FILLED ORAL at 10:49

## 2022-06-02 RX ADMIN — ACETAMINOPHEN 1000 MG: 500 TABLET ORAL at 16:36

## 2022-06-02 RX ADMIN — Medication 1 MILLI-UNITS/MIN: at 07:28

## 2022-06-02 RX ADMIN — BUTORPHANOL TARTRATE 2 MG: 2 INJECTION, SOLUTION INTRAMUSCULAR; INTRAVENOUS at 01:12

## 2022-06-02 RX ADMIN — Medication 166.7 ML: at 09:25

## 2022-06-02 RX ADMIN — IBUPROFEN 800 MG: 800 TABLET, FILM COATED ORAL at 19:46

## 2022-06-02 RX ADMIN — IBUPROFEN 600 MG: 600 TABLET, FILM COATED ORAL at 11:09

## 2022-06-02 RX ADMIN — SODIUM CHLORIDE, PRESERVATIVE FREE 10 ML: 5 INJECTION INTRAVENOUS at 19:47

## 2022-06-02 ASSESSMENT — PAIN DESCRIPTION - DESCRIPTORS
DESCRIPTORS: CRAMPING
DESCRIPTORS: CRAMPING

## 2022-06-02 ASSESSMENT — PAIN DESCRIPTION - LOCATION
LOCATION: ABDOMEN

## 2022-06-02 ASSESSMENT — PAIN DESCRIPTION - ORIENTATION
ORIENTATION: LOWER
ORIENTATION: LOWER

## 2022-06-02 ASSESSMENT — PAIN SCALES - GENERAL
PAINLEVEL_OUTOF10: 3
PAINLEVEL_OUTOF10: 4
PAINLEVEL_OUTOF10: 6
PAINLEVEL_OUTOF10: 2

## 2022-06-02 NOTE — PROGRESS NOTES
Updated Dr. Hira Oliva about SVE with cytotec placement. Discussed plan for AROM as she makes progress.

## 2022-06-02 NOTE — ANESTHESIA PRE PROCEDURE
Department of Anesthesiology  Preprocedure Note       Name:  Swapnil Sifuentes   Age:  33 y.o.  :  1995                                          MRN:  [id number]         Date:  2022      Surgeon: * No surgeons listed *    Procedure: * No procedures listed *    Medications prior to admission:   Prior to Admission medications    Medication Sig Start Date End Date Taking?  Authorizing Provider   calcium carbonate (TUMS) 500 MG chewable tablet Take 1 tablet by mouth as needed for Heartburn   Yes Historical Provider, MD   acetaminophen (TYLENOL) 325 mg tablet Take 650 mg by mouth every 6 hours as needed for Pain   Yes Historical Provider, MD   Prenatal Vit-Fe Fumarate-FA (PRENATAL 1+1 PO) Take by mouth    Historical Provider, MD       Current medications:    Current Facility-Administered Medications   Medication Dose Route Frequency Provider Last Rate Last Admin    miSOPROStol (CYTOTEC) pre-split tablet TABS 25 mcg  25 mcg Vaginal Q4H Delaney Hamm MD   25 mcg at 22 0105    butorphanol (STADOL) injection 2 mg  2 mg IntraVENous Q3H PRN Delaney Hamm MD   2 mg at 22 0112    lactated ringers infusion   IntraVENous Continuous Delaney Hamm  mL/hr at 22 0335 New Bag at 22 0335    lactated ringers bolus  500 mL IntraVENous PRN Delaney Hamm MD        Or    lactated ringers bolus  1,000 mL IntraVENous PRN Delaney Hamm MD        sodium chloride flush 0.9 % injection 5-40 mL  5-40 mL IntraVENous 2 times per day Delaney Hamm MD        sodium chloride flush 0.9 % injection 5-40 mL  5-40 mL IntraVENous PRN Delaney Hamm MD        0.9 % sodium chloride infusion  25 mL IntraVENous PRN Delaney Hamm MD        ondansetron [facility]) injection 4 mg  4 mg IntraVENous Q6H PRN Delaney Hamm MD        acetaminophen (TYLENOL) tablet 650 mg  650 mg Oral Q4H PRN MD Jose Licona benzocaine-menthol (DERMOPLAST) 20-0.5 % spray   Topical PRN Bernardo Lanes, MD        docusate sodium (COLACE) capsule 100 mg  100 mg Oral BID Bernardo Lanes, MD        povidone-iodine (BETADINE) 10 % external solution             lidocaine 1 % injection                Allergies:  No Known Allergies    Problem List:    Patient Active Problem List   Diagnosis Code    Hyperemesis R11.10    32 weeks gestation of pregnancy Z3A.32    Spotting affecting pregnancy in third trimester O26.26    34 weeks gestation of pregnancy Z3A.34    Herpes zoster without complication S79.9    38 weeks gestation of pregnancy Z3A.38    Herpes B00.9    Normal pregnancy, unspecified trimester Z34.90    Vaginal bleeding in pregnancy, second trimester O46.92    31 weeks gestation of pregnancy Z3A.31    Poor fetal growth, affecting management of mother, antepartum condition or complication I29.9975    33 weeks gestation of pregnancy Z3A.33    Non-reactive NST (non-stress test) O28.8    35 weeks gestation of pregnancy Z3A.35    36 weeks gestation of pregnancy Z3A.36    Pregnant and not yet delivered in third trimester Z34.93    NST (non-stress test) reactive Z36.89    Normal pregnancy, antepartum Z34.90    37 weeks gestation of pregnancy Z3A.37       Past Medical History:        Diagnosis Date    Diabetes mellitus ([address] 2019    previous gdm    Herpes 6/4/2019    Mental disorder     anxiety    Rh incompatibility        Past Surgical History:        Procedure Laterality Date    WISDOM TOOTH EXTRACTION         Social History:    Social History     Tobacco Use    Smoking status: Never Smoker    Smokeless tobacco: Never Used   Substance Use Topics    Alcohol use:  No                                Counseling given: Not Answered      Vital Signs (Current):   Vitals:    06/01/22 1721 06/01/22 2316   BP: 107/69 128/88   Pulse: 85 80   Resp: 16 18   Temp: 36.8 °C (98.2 °F) 36.6 °C (97.8 °F)   TempSrc: Oral Oral                                              BP Readings from Last 3 Encounters:   06/01/22 128/88   05/29/22 124/73   05/27/22 117/68       NPO Status:                                                                                 BMI:   Wt Readings from Last 3 Encounters:   05/04/22 176 lb (79.8 kg)   04/26/22 176 lb (79.8 kg)   07/10/21 183 lb (83 kg)     There is no height or weight on file to calculate BMI.    CBC:   Lab Results   Component Value Date    WBC 10.8 06/01/2022    RBC 3.64 06/01/2022    HGB 10.9 06/01/2022    HCT 32.4 06/01/2022    MCV 89.0 06/01/2022    RDW 13.3 06/01/2022     06/01/2022       CMP:   Lab Results   Component Value Date     05/29/2022    K 4.3 05/29/2022    K 3.9 07/10/2021     05/29/2022    CO2 22 05/29/2022    BUN 6 05/29/2022    CREATININE 0.6 05/29/2022    GFRAA >60 05/29/2022    LABGLOM >60 05/29/2022    GLUCOSE 78 05/29/2022    PROT 7.6 05/29/2022    CALCIUM 9.1 05/29/2022    BILITOT 0.2 05/29/2022    ALKPHOS 131 05/29/2022    AST 30 05/29/2022    ALT 10 05/29/2022       POC Tests: No results for input(s): POCGLU, POCNA, POCK, POCCL, POCBUN, POCHEMO, POCHCT in the last 72 hours.     Coags: No results found for: PROTIME, INR, APTT    HCG (If Applicable): No results found for: PREGTESTUR, PREGSERUM, HCG, HCGQUANT     ABGs: No results found for: PHART, PO2ART, MUX4DGL, VKS6VUV, BEART, M0VVNOAV     Type & Screen (If Applicable):  No results found for: LABABO, LABRH    Drug/Infectious Status (If Applicable):  No results found for: HIV, HEPCAB    COVID-19 Screening (If Applicable): No results found for: COVID19        Anesthesia Evaluation   no history of anesthetic complications:   Airway: Mallampati: II  TM distance: >3 FB   Neck ROM: full  Mouth opening: > = 3 FB   Dental: normal exam         Pulmonary:Negative Pulmonary ROS and normal exam  breath sounds clear to auscultation                             Cardiovascular:Negative CV ROS            Rhythm: regular  Rate: normal                    Neuro/Psych:   (+) depression/anxiety    (-) psychiatric history           GI/Hepatic/Renal: Neg GI/Hepatic/Renal ROS            Endo/Other: Negative Endo/Other ROS                    Abdominal:             Vascular: negative vascular ROS. Other Findings:           Anesthesia Plan      epidural     ASA 2             Anesthetic plan and risks discussed with patient.                         CHASIDY Dawson - CAROL   6/2/2022

## 2022-06-02 NOTE — PROCEDURES
DELIVERY NOTE    female infant   APGARS 9/9  Bulb suction on perineum  Nuchal cord x 1 reduced  Cord clamped and cut after 30 second delay  Handed to waiting nursing staff  Placenta delivered without complication with three vessel cord intact  Cord gases obtained  No complications  Condition stable  Sponge count correct  Mom and baby to post partum    Que Coronel MD

## 2022-06-02 NOTE — H&P
Department of Obstetrics and Gynecology  Attending Obstetrics History and Physical        CHIEF COMPLAINT: Induction of labor due to nonreassuring fetal heart tracing on nonstress test IUGR     HISTORY OF PRESENT ILLNESS:      The patient is a 33 y.o. female [id number], No LMP recorded. Patient is pregnant. ,  at 37w6d. OB History        5    Para   3    Term   3            AB   1    Living   3       SAB   1    IAB        Ectopic        Molar        Multiple   0    Live Births   3            Patient presents with a chief complaint as above.     Estimated Due Date: Estimated Date of Delivery: 22    PRENATAL CARE:    Complicated by:   Patient Active Problem List   Diagnosis Code    Hyperemesis R11.10    32 weeks gestation of pregnancy Z3A.32    Spotting affecting pregnancy in third trimester O26.26    34 weeks gestation of pregnancy Z3A.34    Herpes zoster without complication R14.6    38 weeks gestation of pregnancy Z3A.38    Herpes B00.9    Normal pregnancy, unspecified trimester Z34.90    Vaginal bleeding in pregnancy, second trimester O47.80    31 weeks gestation of pregnancy Z3A.31    Poor fetal growth, affecting management of mother, antepartum condition or complication X20.4811    33 weeks gestation of pregnancy Z3A.33    Non-reactive NST (non-stress test) O28.8    35 weeks gestation of pregnancy Z3A.35    36 weeks gestation of pregnancy Z3A.36    Pregnant and not yet delivered in third trimester Z34.93    NST (non-stress test) reactive Z36.89    Normal pregnancy, antepartum Z34.90    37 weeks gestation of pregnancy Z3A.37       PAST OB HISTORY  OB History        5    Para   3    Term   3            AB   1    Living   3       SAB   1    IAB        Ectopic        Molar        Multiple   0    Live Births   3                Past Medical History:        Diagnosis Date    Diabetes mellitus ([facility] [address] 2019    previous gdm    Herpes 2019    Mental disorder     anxiety  Rh incompatibility      Past Surgical History:        Procedure Laterality Date    WISDOM TOOTH EXTRACTION       Social History:    TOBACCO:   reports that she has never smoked. She has never used smokeless tobacco.  ETOH:   reports no history of alcohol use. DRUGS:   reports current drug use. Drug: Marijuana Mercedes Cavazos). Family History:   History reviewed. No pertinent family history. Medications Prior to Admission:  Medications Prior to Admission: calcium carbonate (TUMS) 500 MG chewable tablet, Take 1 tablet by mouth as needed for Heartburn  acetaminophen (TYLENOL) 325 mg tablet, Take 650 mg by mouth every 6 hours as needed for Pain  Prenatal Vit-Fe Fumarate-FA (PRENATAL 1+1 PO), Take by mouth  Allergies:  Patient has no known allergies. Review of Systems:   Ears, nose, mouth, throat, and face: negative  Respiratory: negative  Cardiovascular: negative  Gastrointestinal: negative  Genitourinary:negative  Integument/breast: negative  Hematologic/lymphatic: negative  Musculoskeletal:negative  Neurological: negative  Behavioral/Psych: negative  Endocrine: negative  Allergic/Immunologic: negative    PHYSICAL EXAM:    General appearance:  awake, alert, cooperative, no apparent distress, and appears stated age  Neurologic:  Awake, alert, oriented to name, place and time. Cranial nerves II-XII are grossly intact. Motor is 5 out of 5 bilaterally. Cerebellar finger to nose, heel to shin intact. Sensory is intact.   Babinski down going, Romberg negative, and gait is normal.  Lungs:  No increased work of breathing, good air exchange, clear to auscultation bilaterally, no crackles or wheezing  Heart:  Normal apical impulse, regular rate and rhythm, normal S1 and S2, no S3 or S4, and no murmur noted  Abdomen:  No scars, normal bowel sounds, soft, non-distended, non-tender, no masses palpated, no hepatosplenomegally  Fetal heart rate:  Baseline Heart Rate 140, accelerations: Present, decelerations: Present late

## 2022-06-02 NOTE — PROGRESS NOTES
Assumed patient care [id number]. Updated Dr Christy Ruiz on pt status. Updated that at 0500 pt sve 3/70/-2, pt now comfortable with epidural. Pt requests to receive epidural before AROM. Orders to continue plan of care.

## 2022-06-02 NOTE — PROGRESS NOTES
Case reviewed with Dr. Abril Rowland in department   New orders for oxytocin obtained    1664 page out for Dr. Pepito Flores 6, 70, -2     0371 page returned physician aware of SVE and oxytocin started

## 2022-06-02 NOTE — PROGRESS NOTES
Pt up to BR with stedy unable to void, kandy care, under ware, new pads on, gown changed   Transferred in wheelcahir to room 316 report to both oncoming RNs   ID and hugs checked

## 2022-06-02 NOTE — PROGRESS NOTES
Dr. Juan Jacobo notified SVE C, 100, +2 bulging bag oxytocin shut off   Dr. Wilfreod Hodges notified as well and on standby

## 2022-06-02 NOTE — PROGRESS NOTES
Patient admitted to room 316. Does not want tdap. Admission info reviewed with her and she verbalized an understanding.

## 2022-06-02 NOTE — ANESTHESIA PROCEDURE NOTES
Epidural Block    Patient location during procedure: OB  Reason for block: labor epidural  Staffing  Performed: resident/CRNA   Anesthesiologist: Kel Peck MD  Resident/CRNA: CHASIDY Ward - CAROL  Epidural  Patient position: sitting  Prep: ChloraPrep  Patient monitoring: cardiac monitor, continuous pulse ox and frequent blood pressure checks  Approach: midline  Location: L3-4  Injection technique: hanging drop  Provider prep: mask and sterile gloves  Needle  Needle type: Tuohy   Needle gauge: 18 G  Needle length: 3.5 in  Needle insertion depth: 7 cm  Catheter type: end hole  Catheter size: 20 G.   Catheter at skin depth: 14 cm  Test dose: negative  Assessment  Hemodynamics: stable  Attempts: 1  Preanesthetic Checklist  Completed: patient identified, IV checked, site marked, risks and benefits discussed, surgical/procedural consents, equipment checked, pre-op evaluation, timeout performed, anesthesia consent given, oxygen available and monitors applied/VS acknowledged

## 2022-06-02 NOTE — PROGRESS NOTES
Assumed care of patient. RN heard laura yelling at patient from nurses station. RN went into patient room to place cytotec, asked marijaienagapito to step out of the room during SVE and med placement. RN spoke to patient and asked if she felt safe and offered support to patient. Patient stated she does not know why marijaienagapito was yelling, but does not want him removed at this time and will remove him herself if needed. Consent obtained from patient for marijaienagapito to be in room to discuss health status and during SVEs. RN asked boyfriend to reenter room after SVE. Marijaienagapito yelled at RN and claimed he was thrown out of room. RN explained why first SVE was done privately and he was not thrown out of the room, but asked to step out for a moment to establish patient privacy preferences. Marijaienagapito then left unit on his own terms. Patient is tearful, states she is unsure if he will return to unit. She may have a new support person coming. She will inform RN of her choice of a new support person.

## 2022-06-03 LAB
FETAL SCREEN: NORMAL
HCT VFR BLD CALC: 27.5 % (ref 34–48)
HEMOGLOBIN: 9.3 G/DL (ref 11.5–15.5)
RHIG LOT NUMBER: NORMAL

## 2022-06-03 PROCEDURE — 6360000002 HC RX W HCPCS: Performed by: OBSTETRICS & GYNECOLOGY

## 2022-06-03 PROCEDURE — 96372 THER/PROPH/DIAG INJ SC/IM: CPT

## 2022-06-03 PROCEDURE — 85014 HEMATOCRIT: CPT

## 2022-06-03 PROCEDURE — 1220000000 HC SEMI PRIVATE OB R&B

## 2022-06-03 PROCEDURE — 36415 COLL VENOUS BLD VENIPUNCTURE: CPT

## 2022-06-03 PROCEDURE — 6370000000 HC RX 637 (ALT 250 FOR IP): Performed by: OBSTETRICS & GYNECOLOGY

## 2022-06-03 PROCEDURE — 85018 HEMOGLOBIN: CPT

## 2022-06-03 PROCEDURE — 85461 HEMOGLOBIN FETAL: CPT

## 2022-06-03 PROCEDURE — 6370000000 HC RX 637 (ALT 250 FOR IP): Performed by: PHYSICIAN ASSISTANT

## 2022-06-03 RX ORDER — IBUPROFEN 800 MG/1
800 TABLET ORAL EVERY 8 HOURS PRN
Qty: 120 TABLET | Refills: 0 | Status: SHIPPED | OUTPATIENT
Start: 2022-06-03

## 2022-06-03 RX ADMIN — HUMAN RHO(D) IMMUNE GLOBULIN 300 MCG: 300 INJECTION, SOLUTION INTRAMUSCULAR at 09:19

## 2022-06-03 RX ADMIN — IBUPROFEN 800 MG: 800 TABLET, FILM COATED ORAL at 05:36

## 2022-06-03 RX ADMIN — DOCUSATE SODIUM 100 MG: 100 CAPSULE, LIQUID FILLED ORAL at 20:25

## 2022-06-03 RX ADMIN — ACETAMINOPHEN 1000 MG: 500 TABLET ORAL at 11:23

## 2022-06-03 RX ADMIN — ACETAMINOPHEN 1000 MG: 500 TABLET ORAL at 20:25

## 2022-06-03 RX ADMIN — DOCUSATE SODIUM 100 MG: 100 CAPSULE, LIQUID FILLED ORAL at 08:31

## 2022-06-03 RX ADMIN — FERROUS SULFATE TAB 325 MG (65 MG ELEMENTAL FE) 325 MG: 325 (65 FE) TAB at 08:31

## 2022-06-03 RX ADMIN — IBUPROFEN 800 MG: 800 TABLET, FILM COATED ORAL at 17:37

## 2022-06-03 RX ADMIN — FERROUS SULFATE TAB 325 MG (65 MG ELEMENTAL FE) 325 MG: 325 (65 FE) TAB at 16:30

## 2022-06-03 RX ADMIN — ACETAMINOPHEN 1000 MG: 500 TABLET ORAL at 06:01

## 2022-06-03 ASSESSMENT — PAIN SCALES - GENERAL
PAINLEVEL_OUTOF10: 3
PAINLEVEL_OUTOF10: 4
PAINLEVEL_OUTOF10: 3
PAINLEVEL_OUTOF10: 2
PAINLEVEL_OUTOF10: 2
PAINLEVEL_OUTOF10: 1

## 2022-06-03 ASSESSMENT — PAIN DESCRIPTION - LOCATION
LOCATION: ABDOMEN
LOCATION: ABDOMEN
LOCATION: ABDOMEN;BACK

## 2022-06-03 ASSESSMENT — PAIN DESCRIPTION - DESCRIPTORS
DESCRIPTORS: CRAMPING
DESCRIPTORS: CRAMPING
DESCRIPTORS: CRAMPING;SORE

## 2022-06-03 ASSESSMENT — PAIN - FUNCTIONAL ASSESSMENT: PAIN_FUNCTIONAL_ASSESSMENT: ACTIVITIES ARE NOT PREVENTED

## 2022-06-03 ASSESSMENT — PAIN DESCRIPTION - FREQUENCY: FREQUENCY: INTERMITTENT

## 2022-06-03 NOTE — PROGRESS NOTES
Pt up to bathroom voided and performed kandy care, complaining of cramping. Bleeding with in normal limits, pain meds and ice packs provided to patient.

## 2022-06-03 NOTE — CARE COORDINATION
Social Work discharge planning   Andres advised Aury this morning that Sw met her to discuss Sw consult stating \"+ THC beginning of pregnancy\". Fly Mendez advised she has named her infant daughter Abundio Hernandez. Fly Mendez said she lives with her other children, Daughter [facility] 6/13/15, son Joi Dunaway 5/29/16 and son Laurie Billingsley 6/13/19. She has her best friend Tristan Valverde here waiting to visit her. She also said her granmother Catracho Becerril is a good support. Aury advised that FOB of this baby is not involved and she is NOT putting him on birth certificate. Fly Mendez has [employer]. She said she is signing up with [address]. Pediatric care will be with Dr rYis Patel and [facility] was with Dr Yessenia Tracey. Fly Mendez said she has a car seat, basinet and crib. Fly Mendez said she use vape called \"Delta 8\" early in her pregnancy that \"had traced of THC\". Aury denied any other drug hx. Aury's UDS was negative on 6/1/22. Baby's UDS was negative 6/2/22. Await Cord Stat results. Fly Mendez said she has hx depression and anxiety, denied hx PPD. She said she went to counseling as a teenager. She said she does NOT want counseling resources now. Fly Mendez agreed to talk to her medical provider if PPD concerns arise. Fly Mendez was active participant in conversating. She was on her side with baby on the bed during our meeting. She made appropriate eye contact and stated understanding of  need to call [facility]. ANDRES called referral to [facility] with [id number] [address] [phone]. Andres advised will need to know screening disposition today for potential discharge over weekend. PLAn:  Baby can NOT discharge until [facility] screening and disposition obtained. Sw to follow up with [facility] later today to enquire about screening. Sw to follow up for Cord Stat results. Electronically signed by Nicolás Merrill on 6/3/2022 at 2:35 PM     Addendum-    Andres heard back from Qamar Layne, Supervisor with [address] [phone].   Pauline advised \"Aury Hedda Race Is already open with worker Consuelo Mayer in ongoing. Intake has been screened out\". Therefore, baby CAN discharge home with mom Aury on day of discharge.   Electronically signed by Claudia Brandon on 6/3/2022 at 3:11 PM

## 2022-06-03 NOTE — PROGRESS NOTES
SUBJECTIVE:   Patient without complaint    OBJECTIVE:   /77   Pulse 61   Temp 97.7 °F (36.5 °C) (Oral)   Resp 16   SpO2 97%   Breastfeeding Unknown      Lab Results   Component Value Date    WBC 10.8 06/01/2022    HGB 9.3 (L) 06/03/2022    HCT 27.5 (L) 06/03/2022    MCV 89.0 06/01/2022     06/01/2022         Lochia normal rubra   Uterus firm, nontender    ASSESSMENT/PLAN:   Postpartum Day #1   Advance care   Home tomorrow   Acute blood loss anemia - FeSO4    Makenna Zhang MD 6/3/2022 7:35 AM

## 2022-06-03 NOTE — ANESTHESIA POSTPROCEDURE EVALUATION
Department of Anesthesiology  Postprocedure Note    Patient: Brooke Marquez  MRN: [id number]  YOB: 1995  Date of evaluation: 6/3/2022  Time:  9:28 AM     Procedure Summary     Date: 06/02/22 Room / Location:     Anesthesia Start: 0528 Anesthesia Stop: 4509    Procedure: Labor Analgesia Diagnosis:     Scheduled Providers:  Responsible Provider: Ivan Eid MD    Anesthesia Type: epidural ASA Status: 2          Anesthesia Type: No value filed. Tabby Phase I:      Tabby Phase II:      Last vitals: Reviewed and per EMR flowsheets.        Anesthesia Post Evaluation    Patient location during evaluation: bedside  Patient participation: complete - patient participated  Level of consciousness: awake and alert  Pain score: 2  Airway patency: patent  Nausea & Vomiting: no nausea and no vomiting  Complications: no  Cardiovascular status: blood pressure returned to baseline and hemodynamically stable  Respiratory status: acceptable  Hydration status: stable

## 2022-06-04 VITALS
TEMPERATURE: 98.7 F | SYSTOLIC BLOOD PRESSURE: 118 MMHG | OXYGEN SATURATION: 100 % | HEART RATE: 84 BPM | RESPIRATION RATE: 18 BRPM | DIASTOLIC BLOOD PRESSURE: 73 MMHG

## 2022-06-04 PROCEDURE — 6370000000 HC RX 637 (ALT 250 FOR IP): Performed by: PHYSICIAN ASSISTANT

## 2022-06-04 PROCEDURE — 6370000000 HC RX 637 (ALT 250 FOR IP): Performed by: OBSTETRICS & GYNECOLOGY

## 2022-06-04 RX ADMIN — ACETAMINOPHEN 1000 MG: 500 TABLET ORAL at 09:11

## 2022-06-04 RX ADMIN — FERROUS SULFATE TAB 325 MG (65 MG ELEMENTAL FE) 325 MG: 325 (65 FE) TAB at 09:11

## 2022-06-04 RX ADMIN — DOCUSATE SODIUM 100 MG: 100 CAPSULE, LIQUID FILLED ORAL at 09:11

## 2022-06-04 RX ADMIN — IBUPROFEN 800 MG: 800 TABLET, FILM COATED ORAL at 04:26

## 2022-06-04 ASSESSMENT — PAIN SCALES - GENERAL
PAINLEVEL_OUTOF10: 1
PAINLEVEL_OUTOF10: 3
PAINLEVEL_OUTOF10: 4

## 2022-06-04 ASSESSMENT — PAIN DESCRIPTION - LOCATION
LOCATION: ABDOMEN
LOCATION: ABDOMEN

## 2022-06-04 NOTE — PROGRESS NOTES
Discharge teaching for mom and baby done at this time, patient in understanding of discharge and no questions at this time.

## 2022-06-04 NOTE — PROGRESS NOTES
Postpartum Day 2: Vaginal Delivery    The patient feels well. Pain is well controlled with current medications. Baby is feeding via bottle - Similac with iron. Objective:        Vitals:    06/04/22 0740   BP: 118/73   Pulse: 84   Resp: 18   Temp: 98.7 °F (37.1 °C)   SpO2: 100%         Lab Results   Component Value Date    WBC 10.8 06/01/2022    HGB 9.3 (L) 06/03/2022    HCT 27.5 (L) 06/03/2022    MCV 89.0 06/01/2022     06/01/2022       General:    alert, appears stated age and cooperative   Lochia:  appropriate   Uterine    firm   DVT Evaluation:  No evidence of DVT seen on physical exam.     Assessment:     Status post Vaginal Delivery. good    Plan:     Discharge home with standard precautions and return to clinic in 4-6 weeks.

## 2022-06-12 NOTE — DISCHARGE SUMMARY
Patient admitted on June 1 at 37-6/7 weeks with decelerations on nonstress test at Dr. Omari Amaro office MFM being performed for IUGR induction recommended by Dr. Frida Gil    Cytotec was placed and she received an epidural.  She progressed to 6 cm Pitocin was started she became complete with a bulging bag delivering a female infant Apgars 9 and 9 without complication      On postpartum day #1 she had no complaints vital signs stable afebrile care was advanced and she was discharged home the following day with precautions.     Physicians following patient clarkeist and Sylvester    Level of function stable    Caregiver self and family    Special problems none    Directions follow up in the office in 6 weeks

## 2022-06-13 NOTE — CARE COORDINATION
SW Discharge Planning     Baby's cordstat was noted to be negative for all tested substances     Electronically signed by AGAPITO Joseph on 6/13/2022 at 10:43 AM

## 2024-02-05 ENCOUNTER — OFFICE VISIT (OUTPATIENT)
Dept: FAMILY MEDICINE CLINIC | Age: 29
End: 2024-02-05
Payer: MEDICAID

## 2024-02-05 VITALS
HEIGHT: 68 IN | DIASTOLIC BLOOD PRESSURE: 82 MMHG | HEART RATE: 109 BPM | TEMPERATURE: 100.1 F | OXYGEN SATURATION: 97 % | WEIGHT: 175.6 LBS | SYSTOLIC BLOOD PRESSURE: 124 MMHG | RESPIRATION RATE: 20 BRPM | BODY MASS INDEX: 26.61 KG/M2

## 2024-02-05 DIAGNOSIS — R50.9 FEVER, UNSPECIFIED FEVER CAUSE: Primary | ICD-10-CM

## 2024-02-05 DIAGNOSIS — J10.1 INFLUENZA B: ICD-10-CM

## 2024-02-05 LAB
INFLUENZA A ANTIGEN, POC: NORMAL
INFLUENZA B ANTIGEN, POC: NORMAL
S PYO AG THROAT QL: NORMAL

## 2024-02-05 PROCEDURE — 1036F TOBACCO NON-USER: CPT | Performed by: STUDENT IN AN ORGANIZED HEALTH CARE EDUCATION/TRAINING PROGRAM

## 2024-02-05 PROCEDURE — G8484 FLU IMMUNIZE NO ADMIN: HCPCS | Performed by: STUDENT IN AN ORGANIZED HEALTH CARE EDUCATION/TRAINING PROGRAM

## 2024-02-05 PROCEDURE — G8427 DOCREV CUR MEDS BY ELIG CLIN: HCPCS | Performed by: STUDENT IN AN ORGANIZED HEALTH CARE EDUCATION/TRAINING PROGRAM

## 2024-02-05 PROCEDURE — 87804 INFLUENZA ASSAY W/OPTIC: CPT | Performed by: STUDENT IN AN ORGANIZED HEALTH CARE EDUCATION/TRAINING PROGRAM

## 2024-02-05 PROCEDURE — G8419 CALC BMI OUT NRM PARAM NOF/U: HCPCS | Performed by: STUDENT IN AN ORGANIZED HEALTH CARE EDUCATION/TRAINING PROGRAM

## 2024-02-05 PROCEDURE — 87880 STREP A ASSAY W/OPTIC: CPT | Performed by: STUDENT IN AN ORGANIZED HEALTH CARE EDUCATION/TRAINING PROGRAM

## 2024-02-05 PROCEDURE — 99204 OFFICE O/P NEW MOD 45 MIN: CPT | Performed by: STUDENT IN AN ORGANIZED HEALTH CARE EDUCATION/TRAINING PROGRAM

## 2024-02-05 RX ORDER — BENZONATATE 100 MG/1
100 CAPSULE ORAL 3 TIMES DAILY PRN
Qty: 21 CAPSULE | Refills: 0 | Status: SHIPPED | OUTPATIENT
Start: 2024-02-05 | End: 2024-02-12

## 2024-02-05 RX ORDER — METHYLPREDNISOLONE 4 MG/1
TABLET ORAL
Qty: 1 KIT | Refills: 0 | Status: SHIPPED | OUTPATIENT
Start: 2024-02-05 | End: 2024-02-11

## 2024-02-05 ASSESSMENT — ENCOUNTER SYMPTOMS
RHINORRHEA: 0
SORE THROAT: 1
COUGH: 1
SHORTNESS OF BREATH: 0
ABDOMINAL PAIN: 0
NAUSEA: 0
VOMITING: 0

## 2024-02-05 NOTE — PROGRESS NOTES
Aury Pak (:  1995) is a 29 y.o. female, here for evaluation of the following chief complaint(s):  Cough (Onset x 1 week /States she has taken marlon seltzer and mucin ex  /Patient took covid test yesterday and it was negative ), Neck Pain (States that bilateral glands feel swollen in neck ), Fatigue, and Fever (Onset x 2 days /States fever yesterday was 102.1 )         ASSESSMENT/PLAN:  1. Fever, unspecified fever cause  -     POCT rapid strep A  -     POCT Influenza A/B Antigen  2. Influenza B  -     methylPREDNISolone (MEDROL DOSEPACK) 4 MG tablet; Take by mouth., Disp-1 kit, R-0Normal  -     benzonatate (TESSALON PERLES) 100 MG capsule; Take 1 capsule by mouth 3 times daily as needed for Cough, Disp-21 capsule, R-0Normal    Flu B +, symptoms for over a week. Symptomatic treatment. Steroids for neck pain likely flu related myalgia + tessalon for cough. Discussed return and ER precautions. Patient and or parent verbalized understanding. Tylenol for fever    Return if symptoms worsen or fail to improve.         Subjective   SUBJECTIVE/OBJECTIVE:  Neck pain  Fever-tmax of 102  Sore throat  Cough  Fatigue  Decreased appetite  Going on about a week  No shortness of breath  Has been taking dayquil at home        Review of Systems   Constitutional:  Positive for chills and fever.   HENT:  Positive for sore throat. Negative for congestion and rhinorrhea.    Respiratory:  Positive for cough. Negative for shortness of breath.    Cardiovascular:  Negative for chest pain and leg swelling.   Gastrointestinal:  Negative for abdominal pain, nausea and vomiting.   Genitourinary:  Negative for dysuria and hematuria.   Musculoskeletal:  Positive for arthralgias and myalgias.   Skin:  Negative for rash and wound.   Neurological:  Positive for headaches. Negative for dizziness and light-headedness.          Objective   Physical Exam  Vitals reviewed.   Constitutional:       General: She is not in acute

## 2024-04-04 ENCOUNTER — HOSPITAL ENCOUNTER (OUTPATIENT)
Age: 29
Discharge: HOME | End: 2024-04-06

## 2024-04-10 LAB — SURGICAL PATHOLOGY REPORT: NORMAL
